# Patient Record
Sex: FEMALE | Race: BLACK OR AFRICAN AMERICAN
[De-identification: names, ages, dates, MRNs, and addresses within clinical notes are randomized per-mention and may not be internally consistent; named-entity substitution may affect disease eponyms.]

---

## 2017-06-21 ENCOUNTER — HOSPITAL ENCOUNTER (INPATIENT)
Dept: HOSPITAL 17 - NEPC | Age: 62
LOS: 2 days | Discharge: HOME | DRG: 885 | End: 2017-06-23
Attending: PSYCHIATRY & NEUROLOGY | Admitting: PSYCHIATRY & NEUROLOGY
Payer: MEDICARE

## 2017-06-21 VITALS
RESPIRATION RATE: 19 BRPM | HEART RATE: 84 BPM | OXYGEN SATURATION: 99 % | DIASTOLIC BLOOD PRESSURE: 53 MMHG | SYSTOLIC BLOOD PRESSURE: 112 MMHG

## 2017-06-21 VITALS
HEART RATE: 82 BPM | DIASTOLIC BLOOD PRESSURE: 56 MMHG | SYSTOLIC BLOOD PRESSURE: 99 MMHG | RESPIRATION RATE: 16 BRPM | OXYGEN SATURATION: 95 %

## 2017-06-21 VITALS — HEIGHT: 64 IN | BODY MASS INDEX: 21.49 KG/M2 | WEIGHT: 125.88 LBS

## 2017-06-21 VITALS
DIASTOLIC BLOOD PRESSURE: 74 MMHG | HEART RATE: 80 BPM | SYSTOLIC BLOOD PRESSURE: 138 MMHG | OXYGEN SATURATION: 91 % | RESPIRATION RATE: 18 BRPM

## 2017-06-21 VITALS
TEMPERATURE: 97.8 F | HEART RATE: 112 BPM | RESPIRATION RATE: 20 BRPM | DIASTOLIC BLOOD PRESSURE: 67 MMHG | SYSTOLIC BLOOD PRESSURE: 122 MMHG | OXYGEN SATURATION: 100 %

## 2017-06-21 DIAGNOSIS — Z88.0: ICD-10-CM

## 2017-06-21 DIAGNOSIS — E78.00: ICD-10-CM

## 2017-06-21 DIAGNOSIS — H40.9: ICD-10-CM

## 2017-06-21 DIAGNOSIS — Z86.59: ICD-10-CM

## 2017-06-21 DIAGNOSIS — F23: Primary | ICD-10-CM

## 2017-06-21 DIAGNOSIS — Z79.84: ICD-10-CM

## 2017-06-21 DIAGNOSIS — I10: ICD-10-CM

## 2017-06-21 DIAGNOSIS — J45.909: ICD-10-CM

## 2017-06-21 DIAGNOSIS — E11.9: ICD-10-CM

## 2017-06-21 DIAGNOSIS — K21.9: ICD-10-CM

## 2017-06-21 LAB
ALP SERPL-CCNC: 111 U/L (ref 45–117)
ALT SERPL-CCNC: 28 U/L (ref 10–53)
AMPHETAMINE, URINE: (no result)
ANION GAP SERPL CALC-SCNC: 12 MEQ/L (ref 5–15)
AST SERPL-CCNC: 30 U/L (ref 15–37)
BARBITURATES, URINE: (no result)
BASOPHILS # BLD AUTO: 0.1 TH/MM3 (ref 0–0.2)
BASOPHILS NFR BLD: 0.8 % (ref 0–2)
BILIRUB SERPL-MCNC: 0.4 MG/DL (ref 0.2–1)
BUN SERPL-MCNC: 6 MG/DL (ref 7–18)
CHLORIDE SERPL-SCNC: 105 MEQ/L (ref 98–107)
COCAINE UR-MCNC: (no result) NG/ML
COLOR UR: YELLOW
COMMENT (UR): (no result)
CULTURE IF INDICATED: (no result)
EOSINOPHIL # BLD: 0.2 TH/MM3 (ref 0–0.4)
EOSINOPHIL NFR BLD: 2.5 % (ref 0–4)
ERYTHROCYTE [DISTWIDTH] IN BLOOD BY AUTOMATED COUNT: 15.5 % (ref 11.6–17.2)
GFR SERPLBLD BASED ON 1.73 SQ M-ARVRAT: 64 ML/MIN (ref 89–?)
GLUCOSE UR STRIP-MCNC: (no result) MG/DL
HCO3 BLD-SCNC: 24 MEQ/L (ref 21–32)
HCT VFR BLD CALC: 42 % (ref 35–46)
HEMO FLAGS: (no result)
HGB UR QL STRIP: (no result)
KETONES UR STRIP-MCNC: (no result) MG/DL
LYMPHOCYTES # BLD AUTO: 4.3 TH/MM3 (ref 1–4.8)
LYMPHOCYTES NFR BLD AUTO: 49.8 % (ref 9–44)
MCH RBC QN AUTO: 26.5 PG (ref 27–34)
MCHC RBC AUTO-ENTMCNC: 31 % (ref 32–36)
MCV RBC AUTO: 85.5 FL (ref 80–100)
MONOCYTES NFR BLD: 8.5 % (ref 0–8)
MUCOUS THREADS #/AREA URNS LPF: (no result) /LPF
NEUTROPHILS # BLD AUTO: 3.3 TH/MM3 (ref 1.8–7.7)
NEUTROPHILS NFR BLD AUTO: 38.4 % (ref 16–70)
NITRITE UR QL STRIP: (no result)
PLATELET # BLD: 344 TH/MM3 (ref 150–450)
POTASSIUM SERPL-SCNC: 4.4 MEQ/L (ref 3.5–5.1)
RBC # BLD AUTO: 4.91 MIL/MM3 (ref 4–5.3)
SODIUM SERPL-SCNC: 141 MEQ/L (ref 136–145)
SP GR UR STRIP: 1.01 (ref 1–1.03)
SQUAMOUS #/AREA URNS HPF: 1 /HPF (ref 0–5)
WBC # BLD AUTO: 8.6 TH/MM3 (ref 4–11)

## 2017-06-21 PROCEDURE — 96374 THER/PROPH/DIAG INJ IV PUSH: CPT

## 2017-06-21 PROCEDURE — 80307 DRUG TEST PRSMV CHEM ANLYZR: CPT

## 2017-06-21 PROCEDURE — 70450 CT HEAD/BRAIN W/O DYE: CPT

## 2017-06-21 PROCEDURE — 81001 URINALYSIS AUTO W/SCOPE: CPT

## 2017-06-21 PROCEDURE — 96375 TX/PRO/DX INJ NEW DRUG ADDON: CPT

## 2017-06-21 PROCEDURE — 80053 COMPREHEN METABOLIC PANEL: CPT

## 2017-06-21 PROCEDURE — 85025 COMPLETE CBC W/AUTO DIFF WBC: CPT

## 2017-06-21 PROCEDURE — P9612 CATHETERIZE FOR URINE SPEC: HCPCS

## 2017-06-21 PROCEDURE — 96372 THER/PROPH/DIAG INJ SC/IM: CPT

## 2017-06-21 RX ADMIN — HALOPERIDOL ONE MG: 5 INJECTION INTRAMUSCULAR at 18:10

## 2017-06-21 RX ADMIN — HALOPERIDOL ONE MG: 5 INJECTION INTRAMUSCULAR at 17:47

## 2017-06-21 NOTE — PD
HPI


Chief Complaint:  Psychiatric Symptoms


Time Seen by Provider:  17:57


Travel History


International Travel<30 days:  No


Contact w/Intl Traveler<30days:  No


Traveled to known affect area:  No





History of Present Illness


HPI


This is a 62-year-old female with a history of seizure disorder, bipolar 

disorder, previous psychosis, presents here with acute psychosis.   

states that over last 2 days she's been extremely manic intermittently.  He 

reports they were seen by their neurologist today who recommended that they go 

to her psychiatrist for management.  Patient's  states that she's been 

seen multiple times at [facility].  Previous records show that she's 

been admitted for acute psychosis.  There is no reported ingestions of toxic 

substances.  There is no report of injury.  The  states that she becomes 

violent and then becomes cooperative man becomes violent again.  She'll be 

placed under Baker act by this physician.





PFSH


Past Medical History


Asthma:  Yes


Blood Disorders:  No


Bipolar Disorder:  Yes


Cancer:  No


Cardiovascular Problems:  Yes


High Cholesterol:  Yes


Diabetes:  Yes


Patient Takes Glucophage:  Yes


Diminished Hearing:  No


Endocrine:  No


Gastrointestinal Disorders:  Yes


GERD:  Yes


Glaucoma:  Yes


Genitourinary:  No


Immune Disorder:  No


Musculoskeletal:  Yes


Neurologic:  Yes


Psychiatric:  Yes (BIPOLAR )


Reproductive:  No


Respiratory:  Yes


Seizures:  Yes


Sickle Cell Disease:  No


Tetanus Vaccination:  > 5 Years


Influenza Vaccination:  Yes


Pregnant?:  Not Pregnant


Tubal Ligation:  Yes





Past Surgical History


Abdominal Surgery:  No


Cardiac Surgery:  No


Ear Surgery:  No


Endocrine Surgery:  No


Eye Surgery:  No


Genitourinary Surgery:  No


Gynecologic Surgery:  Yes (FROM HISTORY- PATIENT UNABLE TO PROVIDE INFO)


Oral Surgery:  No


Thoracic Surgery:  No


Other Surgery:  Yes (TUBAL LIGATION 1985)





Social History


Alcohol Use:  No


Tobacco Use:  No


Substance Use:  No





Allergies-Medications


(Allergen,Severity, Reaction):  


Coded Allergies:  


     Penicillin (Verified  Allergy, Intermediate, SWELLING, 6/21/17)


Reported Meds & Prescriptions





Reported Meds & Active Scripts


Active


Reported


Folic Acid 1 Mg Tablet 1 Mg PO DAILY


Ergocalciferol 50,000 Unit Cap 50,000 Units PO Q7D


Donepezil 10 Mg Tab 10 Mg PO HS


Coreg (Carvedilol) 3.125 Mg Tab 3.125 Mg PO BID


Amitriptyline (Amitriptyline HCl) 150 Mg Tab 150 Mg PO HS


Xanax (Alprazolam) 0.5 Mg Tab 0.5 Mg PO Q6H PRN


Risperdal (Risperidone) 1 Mg Tab 1 Mg PO BID


Metformin (Metformin HCl) 1,000 Mg Tab 1,000 Mg PO BIDPC


     With meals








Review of Systems


ROS Limitations:  Psychotic (unable to obtain review of systems secondary to 

patient's psychosis.)


Except as stated in HPI:  all other systems reviewed are Neg





Physical Exam


Narrative


GENERAL: Well developed well-nourished female who is acutely psychotic and 

aggressive towards staff.  The patient was spitting at staff and required 

locked restraints.


SKIN: Focused skin assessment warm/dry.


HEAD: Atraumatic. Normocephalic. 


EYES:  No scleral icterus. No injection or drainage. 


ENT: No nasal bleeding or discharge.  Mucous membranes pink and moist.


NECK: Trachea midline. No JVD. 


CARDIOVASCULAR: Tachycardic.  No murmurs appreciated.


RESPIRATORY: No accessory muscle use. Clear to auscultation. Breath sounds 

equal bilaterally. 


GASTROINTESTINAL: Abdomen soft, non-tender, nondistended. Hepatic and splenic 

margins not palpable. 


MUSCULOSKELETAL: No obvious deformities. No clubbing.  No cyanosis.  No edema. 


NEUROLOGICAL: Awake and psychotic.  The patient was screaming and 

intermittently singing amazing Tracy.. No obvious cranial nerve deficits.  

Motor grossly within normal limits. Normal speech.


PSYCHIATRIC: Acutely psychotic..





Data


Data


Last Documented VS





Vital Signs








  Date Time  Temp Pulse Resp B/P Pulse Ox O2 Delivery O2 Flow Rate FiO2


 


6/21/17 17:50   18     


 


6/21/17 17:46 97.8 112  122/67 100   








Orders





 Haloperidol Inj (Haldol Inj) (6/21/17 17:47)


Lorazepam Inj (Ativan Inj) (6/21/17 17:48)


Diphenhydramine Inj (Benadryl Inj) (6/21/17 17:49)


Complete Blood Count With Diff (6/21/17 18:03)


Comprehensive Metabolic Panel (6/21/17 18:03)


Urinalysis - C+S If Indicated (6/21/17 18:03)


Psych Screen (6/21/17 18:03)


Haloperidol Inj (Haldol Inj) (6/21/17 18:15)


Lorazepam Inj (Ativan Inj) (6/21/17 18:15)


Drug Screen, Random Urine (6/21/17 18:03)


Ct Brain W/O Iv Contrast(Rout) (6/21/17 18:03)


Diphenhydramine Inj (Benadryl Inj) (6/21/17 18:15)





Labs





 Laboratory Tests








Test 6/21/17 6/21/17





 18:11 18:30


 


White Blood Count 8.6 TH/MM3 


 


Red Blood Count 4.91 MIL/MM3 


 


Hemoglobin 13.0 GM/DL 


 


Hematocrit 42.0 % 


 


Mean Corpuscular Volume 85.5 FL 


 


Mean Corpuscular Hemoglobin 26.5 PG 


 


Mean Corpuscular Hemoglobin 31.0 % 





Concent  


 


Red Cell Distribution Width 15.5 % 


 


Platelet Count 344 TH/MM3 


 


Mean Platelet Volume 8.6 FL 


 


Neutrophils (%) (Auto) 38.4 % 


 


Lymphocytes (%) (Auto) 49.8 % 


 


Monocytes (%) (Auto) 8.5 % 


 


Eosinophils (%) (Auto) 2.5 % 


 


Basophils (%) (Auto) 0.8 % 


 


Neutrophils # (Auto) 3.3 TH/MM3 


 


Lymphocytes # (Auto) 4.3 TH/MM3 


 


Monocytes # (Auto) 0.7 TH/MM3 


 


Eosinophils # (Auto) 0.2 TH/MM3 


 


Basophils # (Auto) 0.1 TH/MM3 


 


CBC Comment DIFF FINAL  


 


Differential Comment   


 


Urine Color  YELLOW 


 


Urine Turbidity  CLEAR 


 


Urine pH  5.5 


 


Urine Specific Gravity  1.013 


 


Urine Protein  NEG mg/dL


 


Urine Glucose (UA)  NEG mg/dL


 


Urine Ketones  NEG mg/dL


 


Urine Occult Blood  NEG 


 


Urine Nitrite  NEG 


 


Urine Bilirubin  NEG 


 


Urine Urobilinogen  LESS THAN 2.0





  MG/DL


 


Urine Leukocyte Esterase  TRACE 


 


Urine RBC  LESS THAN 1





  /hpf


 


Urine WBC  LESS THAN 1





  /hpf


 


Urine Squamous Epithelial  1 /hpf





Cells  


 


Urine Mucus  FEW /lpf


 


Microscopic Urinalysis Comment  CATH-CULT NOT





  IND











MDM


Medical Decision Making


Medical Screen Exam Complete:  Yes


Emergency Medical Condition:  Yes


Differential Diagnosis


Acute manic episode versus psychosis NOS versus substance induced mood disorder 

versus metabolic derangement.


Narrative Course


62-year-old female who presents acutely psychotic.  The patient has a history 

of bipolar disorder.  She's been admitted previously for psychosis.  CT scan is 

pending at this time.  Metabolic work up is pending at this time.  She'll be 

placed on a Baker act by this physician.  If all is within normal limits, she'

ll be cleared for psychiatric admission.  She'll be signed out to Dr. He Freed cool follow up on her labs and CT and if appropriate clear her for 

psychiatry.





Diagnosis





 Primary Impression:  


 Acute psychosis


 Additional Impressions:  


 History of bipolar disorder


 History of seizure disorder








Bereket Mclaughlin MD Jun 21, 2017 19:00

## 2017-06-21 NOTE — RADRPT
EXAM DATE/TIME:  06/21/2017 18:45 

 

[facility] COMPARISON:     

No previous studies available for comparison.

 

 

INDICATIONS :     

Altered mental status.

                      

 

RADIATION DOSE:     

56.80 CTDIvol (mGy) 

 

 

 

MEDICAL HISTORY :     

Seizures. Cardiovascular disease 

 

SURGICAL HISTORY :      

Tubal ligation. 

 

ENCOUNTER:      

Initial

 

ACUITY:      

1 day

 

PAIN SCALE:      

0/10

 

LOCATION:        

cranial 

 

TECHNIQUE:     

Multiple contiguous axial images were obtained of the head.  Using automated exposure control and adj
ustment of the mA and/or kV according to patient size, radiation dose was kept as low as reasonably a
chievable to obtain optimal diagnostic quality images. 

 

FINDINGS:     

 

CEREBRUM:     

The ventricles are normal.  No evidence of midline shift, mass lesion, hemorrhage or acute infarction
.  No extra-axial fluid collections are seen.

 

POSTERIOR FOSSA:     

The cerebellum and brainstem demonstrate no acute finding.  The 4th ventricle is midline.  The cerebe
llopontine angle is unremarkable.

 

EXTRACRANIAL:     

Visualized sinuses are clear.

 

SKULL:     

The calvaria is intact.  No evidence of skull fracture.

 

CONCLUSION:     

No acute intracranial abnormality is identified.

 

 

 

 Stephon Reynolds MD on June 21, 2017 at 19:14           

Board Certified Radiologist.

 This report was verified electronically.

## 2017-06-21 NOTE — PD
Data


Data


Last Documented VS





Vital Signs








  Date Time  Temp Pulse Resp B/P Pulse Ox O2 Delivery O2 Flow Rate FiO2


 


6/21/17 19:01  82 16 99/56 95 Room Air  


 


6/21/17 17:46 97.8       








Orders





 Haloperidol Inj (Haldol Inj) (6/21/17 17:47)


Lorazepam Inj (Ativan Inj) (6/21/17 17:48)


Diphenhydramine Inj (Benadryl Inj) (6/21/17 17:49)


Complete Blood Count With Diff (6/21/17 18:03)


Comprehensive Metabolic Panel (6/21/17 18:03)


Urinalysis - C+S If Indicated (6/21/17 18:03)


Psych Screen (6/21/17 18:03)


Haloperidol Inj (Haldol Inj) (6/21/17 18:15)


Lorazepam Inj (Ativan Inj) (6/21/17 18:15)


Drug Screen, Random Urine (6/21/17 18:03)


Ct Brain W/O Iv Contrast(Rout) (6/21/17 18:03)


Diphenhydramine Inj (Benadryl Inj) (6/21/17 18:15)


Cath For Specimen (6/21/17 18:54)


Restraints Violent (6/21/17 18:54)





Labs





 Laboratory Tests








Test 6/21/17 6/21/17





 18:11 18:30


 


White Blood Count 8.6 TH/MM3 


 


Red Blood Count 4.91 MIL/MM3 


 


Hemoglobin 13.0 GM/DL 


 


Hematocrit 42.0 % 


 


Mean Corpuscular Volume 85.5 FL 


 


Mean Corpuscular Hemoglobin 26.5 PG 


 


Mean Corpuscular Hemoglobin 31.0 % 





Concent  


 


Red Cell Distribution Width 15.5 % 


 


Platelet Count 344 TH/MM3 


 


Mean Platelet Volume 8.6 FL 


 


Neutrophils (%) (Auto) 38.4 % 


 


Lymphocytes (%) (Auto) 49.8 % 


 


Monocytes (%) (Auto) 8.5 % 


 


Eosinophils (%) (Auto) 2.5 % 


 


Basophils (%) (Auto) 0.8 % 


 


Neutrophils # (Auto) 3.3 TH/MM3 


 


Lymphocytes # (Auto) 4.3 TH/MM3 


 


Monocytes # (Auto) 0.7 TH/MM3 


 


Eosinophils # (Auto) 0.2 TH/MM3 


 


Basophils # (Auto) 0.1 TH/MM3 


 


CBC Comment DIFF FINAL  


 


Differential Comment   


 


Sodium Level 141 MEQ/L 


 


Potassium Level 4.4 MEQ/L 


 


Chloride Level 105 MEQ/L 


 


Carbon Dioxide Level 24.0 MEQ/L 


 


Anion Gap 12 MEQ/L 


 


Blood Urea Nitrogen 6 MG/DL 


 


Creatinine 1.05 MG/DL 


 


Estimat Glomerular Filtration 64 ML/MIN 





Rate  


 


Random Glucose 145 MG/DL 


 


Calcium Level 9.4 MG/DL 


 


Total Bilirubin 0.4 MG/DL 


 


Aspartate Amino Transf 30 U/L 





(AST/SGOT)  


 


Alanine Aminotransferase 28 U/L 





(ALT/SGPT)  


 


Alkaline Phosphatase 111 U/L 


 


Total Protein 8.7 GM/DL 


 


Albumin 4.2 GM/DL 


 


Urine Color  YELLOW 


 


Urine Turbidity  CLEAR 


 


Urine pH  5.5 


 


Urine Specific Gravity  1.013 


 


Urine Protein  NEG mg/dL


 


Urine Glucose (UA)  NEG mg/dL


 


Urine Ketones  NEG mg/dL


 


Urine Occult Blood  NEG 


 


Urine Nitrite  NEG 


 


Urine Bilirubin  NEG 


 


Urine Urobilinogen  LESS THAN 2.0





  MG/DL


 


Urine Leukocyte Esterase  TRACE 


 


Urine RBC  LESS THAN 1





  /hpf


 


Urine WBC  LESS THAN 1





  /hpf


 


Urine Squamous Epithelial  1 /hpf





Cells  


 


Urine Mucus  FEW /lpf


 


Microscopic Urinalysis Comment  CATH-CULT NOT





  IND


 


Urine Opiates Screen  NEG 


 


Urine Barbiturates Screen  NEG 


 


Urine Amphetamines Screen  NEG 


 


Urine Benzodiazepines Screen  POS 


 


Urine Cocaine Screen  NEG 


 


Urine Cannabinoids Screen  NEG 











MDM


Supervised Visit with LEONIDAS:  No


Narrative Course


This patient is checked out to me by Dr. Mclaughlin at 7 PM.





Patient came in acutely psychotic but with some time in medication is calm down 

considerably and is now resting comfortable and cooperatively in the bed


She is out of restraints





I reviewed the entirety of her workup which essentially is negative.


She has normal labs and a negative brain CT





She is as medically stable as can be made and as she is under the Baker act 

disposition will be per psychiatry after screening


Diagnosis





 Primary Impression:  


 Acute psychosis


 Additional Impressions:  


 History of bipolar disorder


 History of seizure disorder








He Chaudhry MD Jun 21, 2017 20:08

## 2017-06-22 VITALS
RESPIRATION RATE: 18 BRPM | OXYGEN SATURATION: 99 % | SYSTOLIC BLOOD PRESSURE: 165 MMHG | HEART RATE: 85 BPM | DIASTOLIC BLOOD PRESSURE: 74 MMHG

## 2017-06-22 VITALS — RESPIRATION RATE: 18 BRPM | DIASTOLIC BLOOD PRESSURE: 84 MMHG | HEART RATE: 80 BPM | SYSTOLIC BLOOD PRESSURE: 180 MMHG

## 2017-06-22 VITALS
OXYGEN SATURATION: 99 % | SYSTOLIC BLOOD PRESSURE: 130 MMHG | HEART RATE: 78 BPM | DIASTOLIC BLOOD PRESSURE: 61 MMHG | RESPIRATION RATE: 16 BRPM

## 2017-06-22 VITALS
RESPIRATION RATE: 18 BRPM | DIASTOLIC BLOOD PRESSURE: 88 MMHG | OXYGEN SATURATION: 99 % | SYSTOLIC BLOOD PRESSURE: 116 MMHG | HEART RATE: 116 BPM

## 2017-06-22 VITALS
OXYGEN SATURATION: 98 % | RESPIRATION RATE: 19 BRPM | SYSTOLIC BLOOD PRESSURE: 146 MMHG | DIASTOLIC BLOOD PRESSURE: 68 MMHG | HEART RATE: 93 BPM

## 2017-06-22 RX ADMIN — CARVEDILOL SCH MG: 3.12 TABLET, FILM COATED ORAL at 18:00

## 2017-06-23 VITALS
HEART RATE: 99 BPM | RESPIRATION RATE: 16 BRPM | TEMPERATURE: 96.9 F | OXYGEN SATURATION: 98 % | SYSTOLIC BLOOD PRESSURE: 52 MMHG

## 2017-06-23 VITALS
OXYGEN SATURATION: 98 % | HEART RATE: 118 BPM | SYSTOLIC BLOOD PRESSURE: 102 MMHG | TEMPERATURE: 97.6 F | DIASTOLIC BLOOD PRESSURE: 58 MMHG | RESPIRATION RATE: 16 BRPM

## 2017-06-23 RX ADMIN — CARVEDILOL SCH MG: 3.12 TABLET, FILM COATED ORAL at 06:00

## 2017-06-23 RX ADMIN — CARVEDILOL SCH MG: 3.12 TABLET, FILM COATED ORAL at 06:26

## 2017-06-23 NOTE — PD.PN.STU
Subjective


Remarks


Patient is a 62 year old AA female on disability living with her  and 

adult daughter with a history of bipolar disorder, seizures, and psychosis who 

presents to the clinic from the ED. Patient arrived at the ED last night with 

acute psychosis lasting for several hours prior to arrival. patient reports 

difficulty with organizing her medications which has lead to the inability to 

take them consistently and correctly, causing this psychotic event. She reports 

these events occur once every 2-3 months. When on medication, patient reports 

being stable and functional. She does not remember exactly what medications she 

is on. Patient denies any current psychotic symptoms, but does report feeling 

"groggy" and tired. Patient denies any suicidal or homicidal ideation or 

attempts, past or present





Patient reports additional medical history of hypertension and diabetes. 

Patient denies any alcohol, tobacco, or recreational drug use.





Objective


Vitals





 Vital Signs








  Date Time  Temp Pulse Resp B/P Pulse Ox O2 Delivery O2 Flow Rate FiO2


 


6/23/17 05:30 96.9 99 16 52/ 98   


 


6/23/17 02:50 97.6 118 16 102/58 98   


 


6/22/17 22:00  116 18 116/88 99 Room Air  


 


6/22/17 20:46  80 18 180/84  Room Air  








Result Diagram:  


6/21/17 1811                                                                   

             6/21/17 1811





Objective Remarks


Patient is a well nourished well developed pleasant female who appears her 

stated age. patient is calm and cooperative. Speech is of normal rate and 

contact. Affect is of normal range and intensity. Thought process is clear, 

linear, and goal-orientated. Thought content, cognition, insight, and judgement 

are appropiate.





A/P


Assessment and Plan


1) Brief Psychotic Disorder


2) Bipolar disorder with psychosis - chronic





Patient is a 62 year old female with history of bipolar disorder presenting 

today with brief psychotic disorder. She currently denies any symptoms of 

psychosis and expresses understanding of the importance of her medication's 

effect on her psychosis. She expresses desire to seek help in organizing her 

many anti-psychotic medications. She lives in a stable home with supportive 

family and has been living with this disease for over 20 years. I believe 

patient is in a stable enough state for discharge on conditions that she 

immediately seeks outpatient help in organizing her medications.








Kevin Barajas M3 Jun 23, 2017 13:18

## 2017-06-23 NOTE — HHI.HP
Provisional Diagnosis


Admission Date


Jun 22, 2017 at 16:48


Axis I.


Brief psychotic disorder F 23, history of bipolar disorder Z 86.59





                               Certification of Person's Competence 


                           To Provide Express and Informed Consent





I have personally examined Hermelinda Cabezas , a person being served at 

[facility] on, Jun 23, 2017 13:56.


Express and informed consent means consent voluntarily given in writing, by a 

competent person, after sufficient explanation and disclosure of the subject 

matter involved to enable the person to make a knowing and willful decision 

without any element of force, fraud, deceit, duress, or other form of 

constraint or coercion.





This person is 18 years of age or older, is not now known to be incompetent to 

consent to treatment with a guardian advocate, and does not have a health care 

surrogate or proxy currently making medical treatment decisions.  I have found 

this person to be one of the following:





[xx] Competent to provide express and informed consent, as defined above, for 

voluntary admission to this facility and is competent to provide express and 

informed consent for treatment.  He/she has the consistent capacity to make 

well reasoned, willful, and knowing decisions concerning his or her medical or 

mental health treatment.  The person fully and consistently understands the 

purpose of the admission for examination/placement and is fully capable of 

personally exercising all rights assured [place]





[] Incompetent to provide express and informed consent to voluntary admission, 

and this is incompetent to provide express and informed consent to treatment.  

The person must be transferred to involuntary status and a petition for a 

guardian advocate filed with the Circuit Court.





[] Refusing to provide express and informed consent to voluntary admission but 

is competent to provide express and informed consent for treatment.  The person 

must be discharged or transferred to involuntary status.





Form shall be completed within 24 hours of a person's arrival at the receiving 

facility and filed in the clinical record of each person:


1. Admitted on a voluntary basis


2. Permitted to provide express and informed consent to his/her own treatment


3. Allowed to transfer from involuntary to voluntary status


4. Prior to permitting a person to consent to his or her own treatment after 

having been previously found incompetent to consent to treatment.





History of Present Illness


Capacity:  Has Capacity


HPI


Patient is a 62-year-old Afro-American female comes here under Vasquez act signed 

by Dr. Bereket Mclaughlin [facility] dated 6/21/17 at 1900 hrs. that documented 

reviewed and agreed with essentially stating actively psychotic with aggressive 

behavior to family and staff.  Patient seen screened in ED urine toxicology 

positive for benzodiazepines which is being prescribed.  It appears patient has 

a long history of mental health contact and various medications.  At the 

present time she states she is seeing 2 different clinicians did not take nurse 

practitioner at [facility] and Dr. Jordan rogers local psychiatrist.  

This is led to some confusion with her  and daughter as to the 

appropriate medications.  Leading to the patient showing the psychotic break 

she acknowledges auditory hallucinations and vague visual hallucinations.  

Stating she had seen faces on the TV being distorted.  She did receive Haldol 

Ativan and Benadryl in the ED.  At the present time patient sitting quietly in 

her room medical student [employer] and nurse Rosalva present throughout session 

patient calm cooperative alert and oriented.  Now denying voices or visions 

denying suicidality homicidality.  Does acknowledge some multiple year history 

of issues with multiple prior psychiatric hospitalizations.  She denies any 

alcohol or drug use with this.  We did discuss the need to live up relationship 

with one prescribing psychiatrist.  To prevent the type of confusion perhaps 

duplication of medications in any event at the present time patient is calm 

cooperative willing to follow up with one clinician.  It appears she would 

choose Dr. Jordan rogers.  I will write 1 week medications that I recommend to 

bridge this transition including Respinol 1 mg twice a day Xanax 0.5 at at 

bedtime and Elavil 75 mg at at bedtime with no refills patient is agreeable to 

this.  We will then discharge patient to her family for follow-up within 1 week 

with Dr. rogers





Review of Systems


Except as stated in HPI:  all other systems reviewed are Neg





Past Psych History


Psychological trauma history


Denies


Violence risk - others (6 mos)


Low


Violence risk - self (6 mos)


Low





Substance Abuse History


Drugs/Alcohol past 12 months


Denies





Past Family Social History


Coded Allergies:  


     Penicillin (Verified  Allergy, Intermediate, SWELLING, 6/21/17)


Past Medical History


Patient medically cleared ED


Reported Medications


Folic Acid 1 Mg Tablet1 Mg PO DAILY 


   6/21/17


Ergocalciferol 50,000 Unit Cap50,000 Units PO Q7D  #30 CAP  Ref 0


   6/21/17


Donepezil 10 Mg Tab10 Mg PO HS  #30 TAB  Ref 0


   6/21/17


Carvedilol (Coreg)3.125 Mg Tab3.125 Mg PO BID  #60 TAB  Ref 0


   6/21/17


Amitriptyline 150 Mg [id number] Mg PO HS  #30 TAB  Ref 0


   6/21/17


Alprazolam (Xanax)0.5 Mg Tab0.5 Mg PO Q6H PRN (ANXIETY)  Ref 0


   6/21/17


Risperidone (Risperdal)1 Mg Tab1 Mg PO BID  #30 TAB  Ref 0


   6/21/17


Metformin 1,000 Mg Tab1,000 Mg PO BIDPC  #60 TAB  Ref 0


   With meals


   6/21/17


Discontinued Reported Medications


Metformin 500 Mg [id number] Mg PO BIDPC  #60 TAB  Ref 0


   With meals


   6/21/17





 Current Medications








 Medications


  (Trade)  Dose


 Ordered  Sig/Jayde


 Route  Start Time


 Stop Time Status Last Admin


 


  (Glucophage)  1,000 mg  BID


 PO  6/22/17 21:00


    6/23/17 09:10


 


 


  (risperDAL)  1 mg  BID


 PO  6/22/17 21:00


    6/22/17 21:00


 


 


  (Xanax)  0.5 mg  Q6H  PRN


 PO  6/22/17 18:00


     


 


 


  (Coreg)  3.125 mg  Q12H


 PO  6/22/17 18:00


    6/22/17 18:00


 


 


  (Aricept)  10 mg  HS


 PO  6/22/17 21:00


    6/22/17 21:00


 


 


  (Folate)  1 mg  DAILY


 PO  6/23/17 09:00


    6/23/17 09:10


 


 


  (Ativan)  1 mg  Q6H  PRN


 PO  6/22/17 18:00


     


 


 


  (Ativan Inj)  1 mg  Q6H  PRN


 IM  6/22/17 18:00


    6/23/17 00:11


 


 


  (Cogentin)  1 mg  Q12H  PRN


 PO  6/22/17 18:00


     


 


 


  (Cogentin Inj)  1 mg  Q12H  PRN


 IM  6/22/17 18:00


     


 


 


  (Benadryl)  50 mg  HS  PRN


 PO  6/22/17 18:00


     


 


 


  (Benadryl Inj)  50 mg  HS  PRN


 IM  6/22/17 18:00


     


 


 


  (Tylenol)  650 mg  Q4H  PRN


 PO  6/22/17 18:00


     


 


 


  (Milk Of


 Magnesia Liq)  30 ml  DAILY  PRN


 PO  6/22/17 18:00


     


 


 


  (Mag-Al Plus


 Susp Liq)  30 ml  Q6H  PRN


 PO  6/22/17 18:00


     


 


 


  (SEROquel)  150 mg  BID


 PO  6/22/17 21:00


    6/22/17 21:00


 


 


  (Pill Splitter)  1 ea  UNSCH  PRN


 OTHER  6/22/17 18:15


     


 








Family History


Denies


Social History


Patient lives with  and adult daughter who is moved back and help care 

for


Patient's Strengths (min. 2)


Patient verbal irritable [facility] has supportive family





Physical Exam


Patient seen screened in ED exam reviewed and agreed with patient sitting 

quietly in her room no apparent distress, there is no respiratory distress, no 

abdominal discomfort, patient will 4 extremities without difficulty no abnormal 

motor movements noted


Vital Signs





 Vital Signs








  Date Time  Temp Pulse Resp B/P Pulse Ox O2 Delivery O2 Flow Rate FiO2


 


6/23/17 05:30 96.9 99 16 52/ 98   


 


6/22/17 22:00      Room Air  











Mental Status Examination


Alert oriented  female calm cooperative with good eye contact 

clean and neat


Appearance


Clean neatly


Speech:  Unremarkable


Orientation:  x3


Memory:  Unremarkable


Thought Process:  Logical, Linear


Thought Content:  Bizarre thinking (mildly)


Language


Fair


Fund of Knowledge


Fair


Hallucination Type:  Auditory, Visual (vague)


Attention and Concentration:  Other (fair)


Suicidal Ideation:  No (denies)


Previous Suicide Attempts:  No


Homicidal Ideation:  No (denies)


Insight:  Poor


Judgment:  Poor


Affect:  Other (good range of motion intensity)


Mood:  Euthymic


Motor Activity:  Normal gait





Assessment & Plan


Problem List:  


(1) Acute psychosis


ICD Code:  F23


(2) History of bipolar disorder


ICD Code:  Z86.59


Assessment & Plan


Estimated LOS:  days consistent patient does not meet Vasquez criteria will lift 

Vasquez act patient to be discharged herself with a one-week supply of medication 

mentioned above follow-up within 1 week with Dr. Jordan rogers


Discharge Planning


See above


Request HC Surrog/Guard Advoc?:  No








Stephon Dominguez MD Jun 23, 2017 14:06

## 2018-04-09 ENCOUNTER — HOSPITAL ENCOUNTER (INPATIENT)
Dept: HOSPITAL 17 - NEPC | Age: 63
LOS: 2 days | Discharge: PSYCHIATRIC OR SUBSTANCE USE FACILITY | DRG: 885 | End: 2018-04-11
Payer: MEDICARE

## 2018-04-09 VITALS
HEART RATE: 145 BPM | DIASTOLIC BLOOD PRESSURE: 130 MMHG | TEMPERATURE: 100.9 F | RESPIRATION RATE: 20 BRPM | SYSTOLIC BLOOD PRESSURE: 168 MMHG | OXYGEN SATURATION: 98 %

## 2018-04-09 VITALS
TEMPERATURE: 98.4 F | RESPIRATION RATE: 20 BRPM | DIASTOLIC BLOOD PRESSURE: 71 MMHG | SYSTOLIC BLOOD PRESSURE: 114 MMHG | HEART RATE: 86 BPM | OXYGEN SATURATION: 100 %

## 2018-04-09 VITALS
OXYGEN SATURATION: 100 % | RESPIRATION RATE: 16 BRPM | SYSTOLIC BLOOD PRESSURE: 115 MMHG | DIASTOLIC BLOOD PRESSURE: 64 MMHG | HEART RATE: 82 BPM | TEMPERATURE: 98.2 F

## 2018-04-09 VITALS — HEART RATE: 88 BPM

## 2018-04-09 VITALS
SYSTOLIC BLOOD PRESSURE: 168 MMHG | HEART RATE: 145 BPM | DIASTOLIC BLOOD PRESSURE: 130 MMHG | TEMPERATURE: 100.9 F | OXYGEN SATURATION: 100 % | RESPIRATION RATE: 20 BRPM

## 2018-04-09 VITALS
SYSTOLIC BLOOD PRESSURE: 121 MMHG | OXYGEN SATURATION: 100 % | RESPIRATION RATE: 20 BRPM | HEART RATE: 97 BPM | DIASTOLIC BLOOD PRESSURE: 75 MMHG

## 2018-04-09 VITALS — WEIGHT: 153 LBS | BODY MASS INDEX: 24.59 KG/M2 | HEIGHT: 66 IN

## 2018-04-09 VITALS
HEART RATE: 94 BPM | SYSTOLIC BLOOD PRESSURE: 124 MMHG | OXYGEN SATURATION: 98 % | DIASTOLIC BLOOD PRESSURE: 60 MMHG | TEMPERATURE: 98 F | RESPIRATION RATE: 20 BRPM

## 2018-04-09 VITALS
OXYGEN SATURATION: 99 % | RESPIRATION RATE: 20 BRPM | DIASTOLIC BLOOD PRESSURE: 74 MMHG | HEART RATE: 105 BPM | SYSTOLIC BLOOD PRESSURE: 131 MMHG

## 2018-04-09 DIAGNOSIS — Z88.0: ICD-10-CM

## 2018-04-09 DIAGNOSIS — E87.2: ICD-10-CM

## 2018-04-09 DIAGNOSIS — R32: ICD-10-CM

## 2018-04-09 DIAGNOSIS — F31.9: ICD-10-CM

## 2018-04-09 DIAGNOSIS — G40.909: ICD-10-CM

## 2018-04-09 DIAGNOSIS — E11.9: ICD-10-CM

## 2018-04-09 DIAGNOSIS — H40.9: ICD-10-CM

## 2018-04-09 DIAGNOSIS — E72.20: ICD-10-CM

## 2018-04-09 DIAGNOSIS — A53.0: ICD-10-CM

## 2018-04-09 DIAGNOSIS — Z79.84: ICD-10-CM

## 2018-04-09 DIAGNOSIS — K21.9: ICD-10-CM

## 2018-04-09 DIAGNOSIS — I10: ICD-10-CM

## 2018-04-09 DIAGNOSIS — F22: Primary | ICD-10-CM

## 2018-04-09 DIAGNOSIS — F03.90: ICD-10-CM

## 2018-04-09 DIAGNOSIS — F29: ICD-10-CM

## 2018-04-09 DIAGNOSIS — Z78.1: ICD-10-CM

## 2018-04-09 DIAGNOSIS — E78.00: ICD-10-CM

## 2018-04-09 LAB
ALBUMIN SERPL-MCNC: 3.3 GM/DL (ref 3.4–5)
ALBUMIN SERPL-MCNC: 3.9 GM/DL (ref 3.4–5)
ALP SERPL-CCNC: 107 U/L (ref 45–117)
ALP SERPL-CCNC: 122 U/L (ref 45–117)
ALT SERPL-CCNC: 15 U/L (ref 10–53)
ALT SERPL-CCNC: 18 U/L (ref 10–53)
AST SERPL-CCNC: 26 U/L (ref 15–37)
AST SERPL-CCNC: 30 U/L (ref 15–37)
BASOPHILS # BLD AUTO: 0 TH/MM3 (ref 0–0.2)
BASOPHILS NFR BLD: 0.5 % (ref 0–2)
BILIRUB INDIRECT SERPL-MCNC: 0.3 MG/DL (ref 0–0.8)
BILIRUB SERPL-MCNC: 0.5 MG/DL (ref 0.2–1)
BILIRUB SERPL-MCNC: 0.7 MG/DL (ref 0.2–1)
BUN SERPL-MCNC: 8 MG/DL (ref 7–18)
CALCIUM SERPL-MCNC: 9.1 MG/DL (ref 8.5–10.1)
CHLORIDE SERPL-SCNC: 102 MEQ/L (ref 98–107)
COLOR UR: (no result)
CREAT SERPL-MCNC: 1.19 MG/DL (ref 0.5–1)
DIRECT BILIRUBIN ADULT: 0.2 MG/DL (ref 0–0.2)
EOSINOPHIL # BLD: 0 TH/MM3 (ref 0–0.4)
EOSINOPHIL NFR BLD: 0.1 % (ref 0–4)
ERYTHROCYTE [DISTWIDTH] IN BLOOD BY AUTOMATED COUNT: 14.4 % (ref 11.6–17.2)
GFR SERPLBLD BASED ON 1.73 SQ M-ARVRAT: 55 ML/MIN (ref 89–?)
GLUCOSE SERPL-MCNC: 190 MG/DL (ref 74–106)
GLUCOSE UR STRIP-MCNC: 70 MG/DL
HCO3 BLD-SCNC: 17.8 MEQ/L (ref 21–32)
HCT VFR BLD CALC: 38.6 % (ref 35–46)
HGB BLD-MCNC: 12.5 GM/DL (ref 11.6–15.3)
HGB UR QL STRIP: (no result)
KETONES UR STRIP-MCNC: 40 MG/DL
LACTIC ACID SEPSIS PROTOCOL: 2.5 MMOL/L (ref 0.4–2)
LACTIC ACID SEPSIS PROTOCOL: 7.8 MMOL/L (ref 0.4–2)
LYMPHOCYTES # BLD AUTO: 1.1 TH/MM3 (ref 1–4.8)
LYMPHOCYTES NFR BLD AUTO: 13.2 % (ref 9–44)
MAGNESIUM SERPL-MCNC: 1.9 MG/DL (ref 1.5–2.5)
MCH RBC QN AUTO: 27 PG (ref 27–34)
MCHC RBC AUTO-ENTMCNC: 32.4 % (ref 32–36)
MCV RBC AUTO: 83.2 FL (ref 80–100)
MONOCYTE #: 0.5 TH/MM3 (ref 0–0.9)
MONOCYTES NFR BLD: 5.8 % (ref 0–8)
NEUTROPHILS # BLD AUTO: 6.9 TH/MM3 (ref 1.8–7.7)
NEUTROPHILS NFR BLD AUTO: 80.4 % (ref 16–70)
NITRITE UR QL STRIP: (no result)
PLATELET # BLD: 328 TH/MM3 (ref 150–450)
PMV BLD AUTO: 7.6 FL (ref 7–11)
PROT SERPL-MCNC: 7.5 GM/DL (ref 6.4–8.2)
PROT SERPL-MCNC: 8.5 GM/DL (ref 6.4–8.2)
RBC # BLD AUTO: 4.64 MIL/MM3 (ref 4–5.3)
SODIUM SERPL-SCNC: 137 MEQ/L (ref 136–145)
SP GR UR STRIP: 1.01 (ref 1–1.03)
URINE LEUKOCYTE ESTERASE: (no result)
VIT B12 SERPL-MCNC: 416 PG/ML (ref 193–986)
WBC # BLD AUTO: 8.6 TH/MM3 (ref 4–11)

## 2018-04-09 PROCEDURE — 95819 EEG AWAKE AND ASLEEP: CPT

## 2018-04-09 PROCEDURE — 83735 ASSAY OF MAGNESIUM: CPT

## 2018-04-09 PROCEDURE — P9612 CATHETERIZE FOR URINE SPEC: HCPCS

## 2018-04-09 PROCEDURE — 93005 ELECTROCARDIOGRAM TRACING: CPT

## 2018-04-09 PROCEDURE — 96361 HYDRATE IV INFUSION ADD-ON: CPT

## 2018-04-09 PROCEDURE — 82550 ASSAY OF CK (CPK): CPT

## 2018-04-09 PROCEDURE — 82140 ASSAY OF AMMONIA: CPT

## 2018-04-09 PROCEDURE — 83605 ASSAY OF LACTIC ACID: CPT

## 2018-04-09 PROCEDURE — 82552 ASSAY OF CPK IN BLOOD: CPT

## 2018-04-09 PROCEDURE — 80053 COMPREHEN METABOLIC PANEL: CPT

## 2018-04-09 PROCEDURE — 82607 VITAMIN B-12: CPT

## 2018-04-09 PROCEDURE — 81001 URINALYSIS AUTO W/SCOPE: CPT

## 2018-04-09 PROCEDURE — 70450 CT HEAD/BRAIN W/O DYE: CPT

## 2018-04-09 PROCEDURE — 82746 ASSAY OF FOLIC ACID SERUM: CPT

## 2018-04-09 PROCEDURE — 71045 X-RAY EXAM CHEST 1 VIEW: CPT

## 2018-04-09 PROCEDURE — 80307 DRUG TEST PRSMV CHEM ANLYZR: CPT

## 2018-04-09 PROCEDURE — 87040 BLOOD CULTURE FOR BACTERIA: CPT

## 2018-04-09 PROCEDURE — 85025 COMPLETE CBC W/AUTO DIFF WBC: CPT

## 2018-04-09 PROCEDURE — 86592 SYPHILIS TEST NON-TREP QUAL: CPT

## 2018-04-09 PROCEDURE — 84443 ASSAY THYROID STIM HORMONE: CPT

## 2018-04-09 PROCEDURE — 96374 THER/PROPH/DIAG INJ IV PUSH: CPT

## 2018-04-09 PROCEDURE — 80048 BASIC METABOLIC PNL TOTAL CA: CPT

## 2018-04-09 PROCEDURE — 80076 HEPATIC FUNCTION PANEL: CPT

## 2018-04-09 PROCEDURE — 84425 ASSAY OF VITAMIN B-1: CPT

## 2018-04-09 PROCEDURE — 71046 X-RAY EXAM CHEST 2 VIEWS: CPT

## 2018-04-09 PROCEDURE — 86593 SYPHILIS TEST NON-TREP QUANT: CPT

## 2018-04-09 RX ADMIN — DONEPEZIL HYDROCHLORIDE SCH MG: 5 TABLET, FILM COATED ORAL at 21:29

## 2018-04-09 RX ADMIN — ATORVASTATIN CALCIUM SCH MG: 80 TABLET, FILM COATED ORAL at 21:29

## 2018-04-09 RX ADMIN — Medication SCH ML: at 21:30

## 2018-04-09 RX ADMIN — CARVEDILOL SCH MG: 3.12 TABLET, FILM COATED ORAL at 21:29

## 2018-04-09 NOTE — PD
HPI


Chief Complaint:  Altered Mental Status


Time Seen by Provider:  11:00


Travel History


International Travel<30 days:  No


Contact w/Intl Traveler<30days:  No


Traveled to known affect area:  No





History of Present Illness


HPI


This is a 63-year-old female who, per chart review, has a history of bipolar 

disorder diabetes, presents via EMS reportedly for altered mental status.  

Baseline mental status is unknown.  Duration of acute symptoms is unknown.  The 

patient is currently awake and alert but agitated and combative, requiring the 

use of chemical and physical restraints.  At one point she asked where her 

daughter was.  Otherwise she has been nonverbal during initial evaluation.  The 

nurse was told by paramedics that at some point recently she had her Risperdal 

dosage changed and she has been incontinent of urine for an unknown amount of 

time..  No other additional information is available at this time.  Per chart 

review she was hospitalized in June 2017 for brief psychotic disorder.





PFSH


Past Medical History


Arthritis:  No


Asthma:  Yes


Blood Disorders:  No


Bipolar Disorder:  Yes


Cancer:  No


Cardiovascular Problems:  Yes


High Cholesterol:  Yes


Diabetes:  Yes


Diminished Hearing:  No


Endocrine:  No


Gastrointestinal Disorders:  Yes


GERD:  Yes


Glaucoma:  Yes


Genitourinary:  No


Immune Disorder:  No


Musculoskeletal:  Yes


Neurologic:  Yes


Psychiatric:  Yes (BIPOLAR )


Reproductive:  No


Respiratory:  Yes


Seizures:  Yes


Sickle Cell Disease:  No


Tubal Ligation:  Yes





Past Surgical History


Abdominal Surgery:  No


Cardiac Surgery:  No


Ear Surgery:  No


Endocrine Surgery:  No


Eye Surgery:  No


Genitourinary Surgery:  No


Gynecologic Surgery:  Yes (FROM HISTORY- PATIENT UNABLE TO PROVIDE INFO)


Oral Surgery:  No


Thoracic Surgery:  No


Other Surgery:  Yes (TUBAL LIGATION 1985)





Social History


Alcohol Use:  No


Tobacco Use:  No


Substance Use:  No





Allergies-Medications


(Allergen,Severity, Reaction):  


Coded Allergies:  


     penicillin G (Unverified  Allergy, Intermediate, SWELLING, 4/9/18)


Reported Meds & Prescriptions





Reported Meds & Active Scripts


Active


Reported


Naproxen 500 Mg Tab 500 Mg PO BID PRN


Atorvastatin (Atorvastatin Calcium) 80 Mg Tab 80 Mg PO HS


Amitriptyline (Amitriptyline HCl) 100 Mg Tab 100 Mg PO HS


Xanax (Alprazolam) 1 Mg Tab 1 Mg PO DAILY PRN


Folic Acid 1 Mg Tablet 1 Mg PO DAILY


Ergocalciferol 50,000 Unit Cap 50,000 Units PO Q7D


Donepezil 10 Mg Tab 10 Mg PO HS


Coreg (Carvedilol) 3.125 Mg Tab 3.125 Mg PO BID


Risperdal (Risperidone) 1 Mg Tab 1 Mg PO BID


Metformin (Metformin HCl) 1,000 Mg Tab 1,000 Mg PO BIDPC


     With meals








Review of Systems


ROS Limitations:  Altered Mental Status


Except as stated in HPI:  all other systems reviewed are Neg





Physical Exam


Exam Limitations:  Altered Mental Status


Narrative


GENERAL: Well-developed well-nourished female who is awake but unable to follow 

commands or answer questions in regards to history of present illness.  She is 

tachycardic.


SKIN: Warm and dry.


HEAD: Atraumatic. Normocephalic. 


EYES: Pupils equal and round. No scleral icterus. No injection or drainage. 


ENT: No nasal bleeding or discharge.  Mucous membranes pink and moist.


NECK: Trachea midline. No JVD. 


CARDIOVASCULAR: Regular rate and rhythm.  No murmur appreciated.


RESPIRATORY: No accessory muscle use. Clear to auscultation. Breath sounds 

equal bilaterally. 


GASTROINTESTINAL: Abdomen soft, non-tender, nondistended. Hepatic and splenic 

margins not palpable. 


MUSCULOSKELETAL: No obvious deformities. No clubbing.  No cyanosis.  No edema. 


NEUROLOGICAL: Awake and alert. No obvious cranial nerve deficits.  Motor 

grossly within normal limits.





Data


Data


Last Documented VS





Vital Signs








  Date Time  Temp Pulse Resp B/P (MAP) Pulse Ox O2 Delivery O2 Flow Rate FiO2


 


4/9/18 12:46  105 20 131/74 (93) 99 Room Air  


 


4/9/18 11:00 100.9       








Orders





 Orders


Electrocardiogram (4/9/18 11:04)


Ammonia (4/9/18 11:04)


Complete Blood Count With Diff (4/9/18 11:04)


Comprehensive Metabolic Panel (4/9/18 11:04)


Creatine Kinase (Cpk) (4/9/18 11:04)


Thyroid Stimulating Hormone (4/9/18 11:04)


Urinalysis - C+S If Indicated (4/9/18 11:04)


Lactic Acid Sepsis Protocol (4/9/18 11:04)


Blood Culture (4/9/18 11:04)


Chest, Single Ap (4/9/18 11:04)


Ct Brain W/O Iv Contrast(Rout) (4/9/18 11:04)


Blood Glucose (4/9/18 11:04)


Ecg Monitoring (4/9/18 11:04)


Iv Access Insert/Monitor (4/9/18 11:04)


Oximetry (4/9/18 11:04)


Sodium Chloride 0.9% Flush (Ns Flush) (4/9/18 11:15)


Sodium Chlor 0.9% 1000 Ml Inj (Ns 1000 M (4/9/18 11:04)


Drug Screen, Random Urine (4/9/18 11:04)


Alcohol (Ethanol) (4/9/18 11:04)


Cath For Specimen (4/9/18 11:05)


Lorazepam Inj (Ativan Inj) (4/9/18 11:15)


Restraints Non-Violent DAMION.Q3H (4/9/18 11:09)


CKMB (4/9/18 11:22)


CKMB% (4/9/18 11:22)


Sodium Chlor 0.9% 1000 Ml Inj (Ns 1000 M (4/9/18 12:28)


Cefepime Inj (Maxipime Inj) (4/9/18 13:15)


Vancomycin Inj (Vancomycin Inj) (4/9/18 13:15)


Lactic Acid Sepsis Protocol (4/9/18 13:17)


Admit To Inpatient (4/9/18 )


Code Status (4/9/18 13:15)


Vital Signs (Adult) Q4H (4/9/18 13:15)


Activity Oob With Assistance (4/9/18 13:15)


Neuro Checks Q4H (4/9/18 13:15)


Cardiac Monitor / Telemetry DAMION.Q8H (4/9/18 13:15)


Diet Heart Healthy (4/9/18 Lunch)


Sodium Chloride 0.9% Flush (Ns Flush) (4/9/18 13:15)


Sodium Chloride 0.9% Flush (Ns Flush) (4/9/18 21:00)


Lorazepam (Ativan) (4/9/18 13:15)


Complete Blood Count With Diff (4/10/18 06:00)


Basic Metabolic Panel (Bmp) (4/10/18 06:00)


Hepatic Functional Panel (4/9/18 13:15)


Lactic Acid (4/9/18 16:00)


Magnesium (Mg) (4/9/18 13:15)


Vitamin B1 (Thiamine) (4/9/18 13:15)


Vitamin B12 (4/9/18 13:15)


Rapid Plasma Regin (Rpr) W Ttr (4/9/18 13:15)


Electrocardiogram (4/9/18 )


Scd Bilateral/Knee High DAMION.BID (4/9/18 13:15)


Inpatient Certification (4/9/18 )


Consult Psychiatry (4/9/18 )


Admit Order (Ed Use Only) (4/9/18 13:27)





Labs





Laboratory Tests








Test


  4/9/18


11:22 4/9/18


12:00 4/9/18


12:55


 


Blood Urea Nitrogen 8 MG/DL   


 


Creatinine 1.19 MG/DL   


 


Random Glucose 190 MG/DL   


 


Total Protein 8.5 GM/DL   


 


Albumin 3.9 GM/DL   


 


Calcium Level 9.1 MG/DL   


 


Alkaline Phosphatase 122 U/L   


 


Aspartate Amino Transf


(AST/SGOT) 30 U/L 


  


  


 


 


Alanine Aminotransferase


(ALT/SGPT) 18 U/L 


  


  


 


 


Total Bilirubin 0.7 MG/DL   


 


Sodium Level 137 MEQ/L   


 


Potassium Level 4.0 MEQ/L   


 


Chloride Level 102 MEQ/L   


 


Carbon Dioxide Level 17.8 MEQ/L   


 


Anion Gap 17 MEQ/L   


 


Estimat Glomerular Filtration


Rate 55 ML/MIN 


  


  


 


 


Lactic Acid Level 7.8 mmol/L   


 


Ammonia 75 MCMOL/L   


 


Total Creatine Kinase 478 U/L   


 


Creatine Kinase MB 10.8 NG/ML   


 


Creatine Kinase MB % 2.3 %   


 


Thyroid Stimulating Hormone


3rd Gen 0.565 uIU/ML 


  


  


 


 


Ethyl Alcohol Level


  LESS THAN 3


MG/DL 


  


 


 


Urine Color  LIGHT-YELLOW  


 


Urine Turbidity  CLEAR  


 


Urine pH  7.0  


 


Urine Specific Gravity  1.011  


 


Urine Protein  TRACE mg/dL  


 


Urine Glucose (UA)  70 mg/dL  


 


Urine Ketones  40 mg/dL  


 


Urine Occult Blood  NEG  


 


Urine Nitrite  NEG  


 


Urine Bilirubin  NEG  


 


Urine Urobilinogen


  


  LESS THAN 2.0


MG/DL 


 


 


Urine Leukocyte Esterase  NEG  


 


Urine RBC  2 /hpf  


 


Urine WBC  1 /hpf  


 


Microscopic Urinalysis Comment


  


  CATH-CULT NOT


IND 


 


 


Urine Opiates Screen  NEG  


 


Urine Barbiturates Screen  NEG  


 


Urine Amphetamines Screen  NEG  


 


Urine Benzodiazepines Screen  POS  


 


Urine Cocaine Screen  NEG  


 


Urine Cannabinoids Screen  NEG  


 


White Blood Count   8.6 TH/MM3 


 


Red Blood Count   4.64 MIL/MM3 


 


Hemoglobin   12.5 GM/DL 


 


Hematocrit   38.6 % 


 


Mean Corpuscular Volume   83.2 FL 


 


Mean Corpuscular Hemoglobin   27.0 PG 


 


Mean Corpuscular Hemoglobin


Concent 


  


  32.4 % 


 


 


Red Cell Distribution Width   14.4 % 


 


Platelet Count   328 TH/MM3 


 


Mean Platelet Volume   7.6 FL 


 


Neutrophils (%) (Auto)   80.4 % 


 


Lymphocytes (%) (Auto)   13.2 % 


 


Monocytes (%) (Auto)   5.8 % 


 


Eosinophils (%) (Auto)   0.1 % 


 


Basophils (%) (Auto)   0.5 % 


 


Neutrophils # (Auto)   6.9 TH/MM3 


 


Lymphocytes # (Auto)   1.1 TH/MM3 


 


Monocytes # (Auto)   0.5 TH/MM3 


 


Eosinophils # (Auto)   0.0 TH/MM3 


 


Basophils # (Auto)   0.0 TH/MM3 


 


CBC Comment   DIFF FINAL 


 


Differential Comment    











MDM


Medical Decision Making


Medical Screen Exam Complete:  Yes


Emergency Medical Condition:  Yes


Medical Record Reviewed:  Yes


Differential Diagnosis


Acute psychosis, encephalitis, sepsis, meningitis, UTI, pneumonia, closed head 

injury


Narrative Course


The patient was placed on ECG monitoring pulse oximetry.  She was placed in 

soft restraints.  2 mg of Ativan administered.  Lab work, chest x-ray, CT brain

, blood cultures, urinalysis have been ordered.





1235: The patient's family members have arrived-the patient's , son and 

daughter.  They report that the patient started acting somewhat disoriented 

yesterday evening.  They also believe that there may have been a recent 

medication change, risperidone, although they are uncertain when that would 

have been started.  No additional history is available.





Lab work is been reviewed.  Lactic acid is markedly elevated at 7.8, additional 

liter fluid boluses been ordered.  Ammonia is elevated at 75.  Creatinine is 

1.19, GFR is 55, anion gap 17, glucose 190, urinalysis reveals 70 glucose, 40 

ketones.  CT the brain is normal.  The patient became more appropriate 

throughout her ED course.  Given fever, tachycardia, lactic acidosis, no source 

of infection, Dr. Qiu discussed the possibility of lumbar puncture however 

family was hesitant about this and therefore it will be deferred.  The patient 

was given broad-spectrum antibiotics.  She was admitted to Dr. Coates.





Sepsis Criteria


SIRS Criteria (2 or more):  Temp > 100.9 or < 96.8, Heart rate over 90


Septic Shock Criteria:  Lactic acid >=4





Diagnosis





 Primary Impression:  


 Hyperammonemia


 Additional Impressions:  


 SIRS (systemic inflammatory response syndrome)


 Lactic acidosis





Admitting Information


Admitting Physician Requests:  Admit











Adelso Patel Apr 9, 2018 11:12

## 2018-04-09 NOTE — EKG
Date Performed: 04/09/2018       Time Performed: 11:45:40

 

PTAGE:      63 years

 

EKG:      SINUS TACHYCARDIA POSSIBLE RIGHT VENTRICULAR CONDUCTION DELAY MODERATE ST DEPRESSION ABNORM
AL ECG Compared to prior electrocardiogram, rate has increased And nonspecific STT wave changes with 
right ventricular conduction delay are present. 

 

 PREVIOUS TRACING            : 09/11/2009 16.51

 

DOCTOR:   Wei Hernandez  Interpretating Date/Time  04/09/2018 14:58:33

## 2018-04-09 NOTE — RADRPT
EXAM DATE/TIME:  04/09/2018 12:50 

 

[facility] COMPARISON:     

No previous studies available for comparison.

 

                     

INDICATIONS :     

Fever.

                     

 

MEDICAL HISTORY :     

None.          

 

SURGICAL HISTORY :     

None.   

 

ENCOUNTER:     

Initial                                        

 

ACUITY:     

1 day      

 

PAIN SCORE:     

0/10

 

LOCATION:     

Bilateral chest 

 

FINDINGS:     

Platelike airspace disease in the left lower lung zone with slight volume loss. Cardiomediastinal con
tours are within normal limits. Bony thorax is intact.

 

CONCLUSION:     

1. Platelike airspace disease with associated mild volume loss in the left lower lobe most consistent
 with atelectasis.

 

 

 

 Jae Quintero MD on April 09, 2018 at 13:32           

Board Certified Radiologist.

 This report was verified electronically.

## 2018-04-09 NOTE — HHI.HP
HPI


Service


[facility]


Primary Care Physician


Unknown


Admission Diagnosis





SIRS, lactic acidosis, hyperammonemia


Chief Complaint:  


AMS


Travel History


International Travel<30 Days:  No


Contact w/Intl Traveler <30 Da:  No


Traveled to Known Affected Are:  No


History of Present Illness


Patient is a pleasant and cooperative 63-year-old female who was brought to the 

ER by her family due to altered mental status.


The family reports that the patient recently had a change in her medications.  

The patient has a history of bipolar disorder.  Patient sees a private 

psychiatrist, . 


Per family, patient's Risperdal was recently increased, and the patient has 

become lethargic over the last few days to the point where she was rarely 

getting out of bed and difficult to rouse. 





I have reviewed the patient's outpatient records in the [facility] EHR .  

Unfortunately, it appears that the patient is likely new to [facility] and there are 

no records for me to review in the [facility] EHR. 


I have reviewed the patient's previous records at [place].  Patient was most 

recently admitted under [place] act for psychosis 2017.  At that time 

patient was attended by psychiatry, Dr. Stephon Dominguez. 





Patient now appears much different from the ER's report of Ms. Carpenter 

clinical status upon arrival.  Patient is able to answer questions appropriately

, but not a wonderful historian.  Patient appears nontoxic, and the physical 

examination is unremarkable.  Specifically, patient does NOT appear septic.  

Patient actually has no acute medical complaints at this time.  I will NOT 

resume patient's reported psychiatric medications at this time, since it 

appears she may have been overmedicated.  I have placed consultation with 

psychiatry.  I await with interest psychiatry consultation.








Patient denies fever or chills.  Patient denies cough, shortness of breath, or 

wheezing.  Patient denies sick contacts or recent travel.


Patient denies complaint of neck stiffness or headache.





Review of Systems


Constitutional:  DENIES: Diaphoretic episodes, Fatigue, Fever, Weight gain, 

Weight loss, Chills, Dizziness, Change in appetite, Night Sweats


Endocrine:  DENIES: Heat/cold intolerance, Polydipsia, Polyuria, Polyphagia


Eyes:  DENIES: Blurred vision, Diplopia, Eye inflammation, Eye pain, Vision loss

, Photosensitivity, Double Vision


Ears, nose, mouth, throat:  DENIES: Tinnitus, Hearing loss, Vertigo, Nasal 

discharge, Oral lesions, Throat pain, Hoarseness, Ear Pain, Running Nose, 

Epistaxis, Sinus Pain, Toothache, Odynophagia


Respiratory:  DENIES: Apneas, Cough, Snoring, Wheezing, Hemoptysis, Sputum 

production, Shortness of breath


Cardiovascular:  DENIES: Chest pain, Palpitations, Syncope, Dyspnea on Exertion

, PND, Lower Extremity Edema, Orthopnea, Claudication


Gastrointestinal:  DENIES: Abdominal pain, Black stools, Bloody stools, BRB per 

rectum, Constipation, Diarrhea, GERD, Nausea, Reflux, Vomiting, Difficulty 

Swallowing, Anorexia


Genitourinary:  DENIES: Urinary frequency, Urinary incontinence, Urgency, 

Hematuria, Dysuria, Nocturia


Musculoskeletal:  DENIES: Joint pain, Muscle aches, Stiffness, Joint Swelling, 

Back pain, Neck pain


Integumentary:  DENIES: Abnormal pigmentation, Pruritus, Rash, Nail changes, 

Breast masses, Breast skin changes, Nipple discharge


Hematologic/lymphatic:  DENIES: Bruising, Lymphadenopathy


Immunologic/allergic:  DENIES: Eczema, Urticaria


Neurologic:  DENIES: Abnormal gait, Headache, Localized weakness, Paresthesias, 

Seizures, Speech Problems, Tremor, Poor Balance


Psychiatric:  COMPLAINS OF: History of Bipolar





Past Family Social History


Past Medical History


1) bipolar disorder 


2) prior hospitalization for psychosis


3) hypertension


4) diabetes


5) seizure disorder?


6) memory loss, dementia?


Past Surgical History


1) tubal ligation, 


Allergies:  


Coded Allergies:  


     penicillin G (Unverified  Allergy, Intermediate, SWELLING, 18)


Active Ordered Medications





Last Impressions








Head CT 18 1104 Signed





Impressions: 





 Service Date/Time:  2018 12:21 - CONCLUSION: Negative for an 





 acute process    Kulwant Ramachandran MD  FACR


 


Chest X-Ray 18 1104 Signed





Impressions: 





 Service Date/Time:  2018 12:50 - CONCLUSION:  1. Platelike 





 airspace disease with associated mild volume loss in the left lower lobe most 





 consistent with atelectasis.     Jae Quintero MD 








Family History


Noncontributory


Social History


- 


- Patient lives with  and daughter


- No tobacco


- No alcohol


- No illicit street drugs





Physical Exam


Vital Signs





Vital Signs








  Date Time  Temp Pulse Resp B/P (MAP) Pulse Ox O2 Delivery O2 Flow Rate FiO2


 


18 16:02  97 20 121/75 (90) 100 Room Air  


 


18 13:55 98.4 86 20 114/71 (85) 100 Room Air  


 


18 12:46  105 20 131/74 (93) 99 Room Air  


 


18 11:00 100.9 145 20 168/130 (143) 100 Room Air  


 


18 11:00   20  100 Room Air  


 


18 10:49 100.9 145 20 168/130 (143) 98   








Physical Exam


GENERAL: This is a well-nourished, well-developed patient, in no apparent 

distress.


SKIN: No rashes, ecchymoses or lesions. Cool and dry.


HEAD: Atraumatic. Normocephalic. No temporal or scalp tenderness.


EYES: Pupils equal round and reactive. Extraocular motions intact. No scleral 

icterus. No injection or drainage. 


ENT: Nose without bleeding, purulent drainage or septal hematoma. Throat 

without erythema, tonsillar hypertrophy or exudate. Uvula midline. Airway 

patent.


NECK: Trachea midline. No JVD or lymphadenopathy. Supple, nontender, no 

meningeal signs.


CARDIOVASCULAR: Regular rate and rhythm without murmurs, gallops, or rubs. 


RESPIRATORY: Clear to auscultation. Breath sounds equal bilaterally. No wheezes

, rales, or rhonchi.  


GASTROINTESTINAL: Abdomen soft, non-tender, nondistended. No hepato-splenomegaly

, or palpable masses. No guarding.


MUSCULOSKELETAL: Extremities without clubbing, cyanosis, or edema. No joint 

tenderness, effusion, or edema noted. No calf tenderness. Negative Homans sign 

bilaterally.


NEUROLOGICAL: Awake and alert. Cranial nerves II through XII intact.  Motor and 

sensory grossly within normal limits. Five out of 5 muscle strength in all 

muscle groups.  Normal speech.


Laboratory





Laboratory Tests








Test


  18


11:22 18


12:00 18


12:55 18


13:35


 


Blood Urea Nitrogen 8    


 


Creatinine 1.19    


 


Random Glucose 190    


 


Total Protein 8.5    


 


Albumin 3.9    


 


Calcium Level 9.1    


 


Alkaline Phosphatase 122    


 


Aspartate Amino Transf


(AST/SGOT) 30 


  


  


  


 


 


Alanine Aminotransferase


(ALT/SGPT) 18 


  


  


  


 


 


Total Bilirubin 0.7    


 


Sodium Level 137    


 


Potassium Level 4.0    


 


Chloride Level 102    


 


Carbon Dioxide Level 17.8    


 


Anion Gap 17    


 


Estimat Glomerular Filtration


Rate 55 


  


  


  


 


 


Lactic Acid Level 7.8    2.5 


 


Ammonia 75    


 


Total Creatine Kinase 478    


 


Creatine Kinase MB 10.8    


 


Creatine Kinase MB % 2.3    


 


Thyroid Stimulating Hormone


3rd Gen 0.565 


  


  


  


 


 


Ethyl Alcohol Level LESS THAN 3    


 


Urine Color  LIGHT-YELLOW   


 


Urine Turbidity  CLEAR   


 


Urine pH  7.0   


 


Urine Specific Gravity  1.011   


 


Urine Protein  TRACE   


 


Urine Glucose (UA)  70   


 


Urine Ketones  40   


 


Urine Occult Blood  NEG   


 


Urine Nitrite  NEG   


 


Urine Bilirubin  NEG   


 


Urine Urobilinogen  LESS THAN 2.0   


 


Urine Leukocyte Esterase  NEG   


 


Urine RBC  2   


 


Urine WBC  1   


 


Microscopic Urinalysis Comment


  


  CATH-CULT NOT


IND 


  


 


 


Urine Opiates Screen  NEG   


 


Urine Barbiturates Screen  NEG   


 


Urine Amphetamines Screen  NEG   


 


Urine Benzodiazepines Screen  POS   


 


Urine Cocaine Screen  NEG   


 


Urine Cannabinoids Screen  NEG   


 


White Blood Count   8.6  


 


Red Blood Count   4.64  


 


Hemoglobin   12.5  


 


Hematocrit   38.6  


 


Mean Corpuscular Volume   83.2  


 


Mean Corpuscular Hemoglobin   27.0  


 


Mean Corpuscular Hemoglobin


Concent 


  


  32.4 


  


 


 


Red Cell Distribution Width   14.4  


 


Platelet Count   328  


 


Mean Platelet Volume   7.6  


 


Neutrophils (%) (Auto)   80.4  


 


Lymphocytes (%) (Auto)   13.2  


 


Monocytes (%) (Auto)   5.8  


 


Eosinophils (%) (Auto)   0.1  


 


Basophils (%) (Auto)   0.5  


 


Neutrophils # (Auto)   6.9  


 


Lymphocytes # (Auto)   1.1  


 


Monocytes # (Auto)   0.5  


 


Eosinophils # (Auto)   0.0  


 


Basophils # (Auto)   0.0  


 


CBC Comment   DIFF FINAL  


 


Differential Comment     


 


Test


  18


14:49 


  


  


 


 


Magnesium Level 1.9    


 


Total Bilirubin 0.5    


 


Direct Bilirubin 0.2    


 


Indirect Bilirubin 0.3    


 


Aspartate Amino Transf


(AST/SGOT) 26 


  


  


  


 


 


Alanine Aminotransferase


(ALT/SGPT) 15 


  


  


  


 


 


Alkaline Phosphatase 107    


 


Total Protein 7.5    


 


Albumin 3.3    


 


Vitamin B12 Level 416    














 Date/Time


Source Procedure


Growth Status


 


 


 18 11:22


Blood Peripheral Aerobic Blood Culture


Pending Received


 


 18 11:22


Blood Peripheral Anaerobic Blood Culture


Pending Received








Result Diagram:  


18 1255                                                                    

            18 1122





Imaging





Last Impressions








Head CT 18 1104 Signed





Impressions: 





 Service Date/Time:  2018 12:21 - CONCLUSION: Negative for an 





 acute process    Kulwant Ramachandran MD  FACR


 


Chest X-Ray 18 1104 Signed





Impressions: 





 Service Date/Time:  2018 12:50 - CONCLUSION:  1. Platelike 





 airspace disease with associated mild volume loss in the left lower lobe most 





 consistent with atelectasis.     Jae Quintero MD 











Septic Shock Reassessment


Septic shock perfusion:  reassessment completed





Caprini VTE Risk Assessment


Caprini VTE Risk Assessment:  No/Low Risk (score <= 1)


Caprini Risk Assessment Model











 Point Value = 1          Point Value = 2  Point Value = 3  Point Value = 5


 


Age 41-60


Minor surgery


BMI > 25 kg/m2


Swollen legs


Varicose veins


Pregnancy or postpartum


History of unexplained or recurrent


   spontaneous 


Oral contraceptives or hormone


   replacement


Sepsis (< 1 month)


Serious lung disease, including


   pneumonia (< 1 month)


Abnormal pulmonary function


Acute myocardial infarction


Congestive heart failure (< 1 month)


History of inflammatory bowel disease


Medical patient at bed rest Age 61-74


Arthroscopic surgery


Major open surgery (> 45 min)


Laparoscopic surgery (> 45 min)


Malignancy


Confined to bed (> 72 hours)


Immobilizing plaster cast


Central venous access Age >= 75


History of VTE


Family history of VTE


Factor V Leiden


Prothrombin 30398A


Lupus anticoagulant


Anticardiolipin antibodies


Elevated serum homocysteine


Heparin-induced thrombocytopenia


Other congenital or acquired


   thrombophilia Stroke (< 1 month)


Elective arthroplasty


Hip, pelvis, or leg fracture


Acute spinal cord injury (< 1 month)








Prophylaxis Regimen











   Total Risk


Factor Score Risk Level Prophylaxis Regimen


 


0-1      Low Early ambulation


 


2 Moderate Order ONE of the following:


*Sequential Compression Device (SCD)


*Heparin 5000 units SQ BID


 


3-4 Higher Order ONE of the following medications:


*Heparin 5000 units SQ TID


*Enoxaparin/Lovenox 40 mg SQ daily (WT < 150 kg, CrCl > 30 mL/min)


*Enoxaparin/Lovenox 30 mg SQ daily (WT < 150 kg, CrCl > 10-29 mL/min)


*Enoxaparin/Lovenox 30 mg SQ BID (WT < 150 kg, CrCl > 30 mL/min)


AND/OR


*Sequential Compression Device (SCD)


 


5 or more Highest Order ONE of the following medications:


*Heparin 5000 units SQ TID (Preferred with Epidurals)


*Enoxaparin/Lovenox 40 mg SQ daily (WT < 150 kg, CrCl > 30 mL/min)


*Enoxaparin/Lovenox 30 mg SQ daily (WT < 150 kg, CrCl > 10-29 mL/min)


*Enoxaparin/Lovenox 30 mg SQ BID (WT < 150 kg, CrCl > 30 mL/min)


AND


*Sequential Compression Device (SCD)











Assessment and Plan


Problem List:  


(1) Altered mental status


ICD Codes:  R41.82 - Altered mental status, unspecified


Status:  Acute


Plan:  - Per ER report patient was lethargic upon arrival


- Per family recent medication dose change by psychiatry, Risperdal


- Outpatient records are not available to me


- Patient alert and oriented at the time of my visit, but not good historian as 

to her past medical history


- Patient does not appear septic


- IV Ativan prn agitation


- will NOT restart psychiatric medications


- Await Psychiatry consultation


- It would be helpful to have availability of patient's outpatient records.


- I will discuss case with [facility] case management, and see if outpatient records 

could be obtained from current/previous providers


And scanned into the [facility] EHR for future reference


- DVT prophylaxis


- supportive care


- repeat lactic acid in AM for completeness. 


- anticipate d/c to home in 2-3 days





(2) Bipolar disorder


ICD Codes:  F31.9 - Bipolar disorder, unspecified


Status:  Chronic


Plan:  - await input from Psychiatry





(3) HTN (hypertension)


ICD Codes:  I10 - Essential (primary) hypertension


Status:  Chronic


Plan:  - continue coreg





(4) DM2 (diabetes mellitus, type 2)


ICD Codes:  E11.9 - Type 2 diabetes mellitus without complications


Status:  Chronic


Plan:  - hold metformin


- [facility]








Physician Certification


2 Midnight Certification Type:  Admission for Inpatient Services


Order for Inpatient Services


The services are ordered in accordance with Medicare regulations or non-

Medicare payer requirements, as applicable.  In the case of services not 

specified as inpatient-only, they are appropriately provided as inpatient 

services in accordance with the 2-midnight benchmark.


Estimated LOS (days):  3


3 days is the estimated time the patient will need to remain in the hospital, 

assuming treatment plan goals are met and no additional complications.


Post-Hospital Plan:  Not yet determined





Problem Qualifiers





(1) Altered mental status:  


Qualified Codes:  R41.82 - Altered mental status, unspecified


(2) Bipolar disorder:  





(3) HTN (hypertension):  


Qualified Codes:  I10 - Essential (primary) hypertension


(4) DM2 (diabetes mellitus, type 2):  


Qualified Codes:  E11.8 - Type 2 diabetes mellitus with unspecified 

complications








Jovi Coates DO 2018 17:50

## 2018-04-09 NOTE — RADRPT
EXAM DATE/TIME:  04/09/2018 12:21 

 

[facility] COMPARISON:     CT BRAIN W/O CONTRAST, June 21, 2017, 18:45.

 

INDICATIONS :     Confusion, altered mental status

                      

RADIATION DOSE:     34.92 CTDIvol (mGy) 

 

 

MEDICAL HISTORY :     Seizures. Hypertension. Asthma, diabetes

SURGICAL HISTORY :      Tubal ligation. 

ENCOUNTER:      Initial

ACUITY:      1 day

PAIN SCALE:      Non-responsive

LOCATION:       Bilateral  head

 

TECHNIQUE:     Multiple contiguous axial images were obtained of the head.  Using automated exposure 
control and adjustment of the mA and/or kV according to patient size, radiation dose was kept as low 
as reasonably achievable to obtain optimal diagnostic quality images.   DICOM format image data is av
ailable electronically for review and comparison.  

 

FINDINGS:     

CEREBRUM:     The ventricles are normal for age.  No evidence of midline shift, mass lesion, hemorrha
ge or acute infarction.  No extra-axial fluid collections are seen.

POSTERIOR FOSSA:     The cerebellum and brainstem are intact.  The 4th ventricle is midline.  The cer
ebellopontine angle is unremarkable.

EXTRACRANIAL:     The visualized portion of the orbits is intact.

SKULL:     The calvaria is intact.  No evidence of skull fracture.

 

CONCLUSION:     Negative for an acute process 

 

 Kulwant Ramachandran MD FACR on April 09, 2018 at 12:25           

Board Certified Radiologist.

 This report was verified electronically.

## 2018-04-09 NOTE — PD
Physical Exam


Date Seen by Provider:  Apr 9, 2018


Narrative


This patient was brought to us by EVAC with a chief complaint of altered mental 

status.  She was initially here by herself and was not providing any history.  

A little while later she did start to talk and states that her mind has been 

off for the last few days.





Data


Data


Last Documented VS





Vital Signs








  Date Time  Temp Pulse Resp B/P (MAP) Pulse Ox O2 Delivery O2 Flow Rate FiO2


 


4/9/18 12:46  105 20 131/74 (93) 99 Room Air  


 


4/9/18 11:00 100.9       








Orders





 Orders


Electrocardiogram (4/9/18 11:04)


Ammonia (4/9/18 11:04)


Complete Blood Count With Diff (4/9/18 11:04)


Comprehensive Metabolic Panel (4/9/18 11:04)


Creatine Kinase (Cpk) (4/9/18 11:04)


Thyroid Stimulating Hormone (4/9/18 11:04)


Urinalysis - C+S If Indicated (4/9/18 11:04)


Lactic Acid Sepsis Protocol (4/9/18 11:04)


Blood Culture (4/9/18 11:04)


Chest, Single Ap (4/9/18 11:04)


Ct Brain W/O Iv Contrast(Rout) (4/9/18 11:04)


Blood Glucose (4/9/18 11:04)


Ecg Monitoring (4/9/18 11:04)


Iv Access Insert/Monitor (4/9/18 11:04)


Oximetry (4/9/18 11:04)


Sodium Chloride 0.9% Flush (Ns Flush) (4/9/18 11:15)


Sodium Chlor 0.9% 1000 Ml Inj (Ns 1000 M (4/9/18 11:04)


Drug Screen, Random Urine (4/9/18 11:04)


Alcohol (Ethanol) (4/9/18 11:04)


Cath For Specimen (4/9/18 11:05)


Lorazepam Inj (Ativan Inj) (4/9/18 11:15)


Restraints Non-Violent DAMION.Q3H (4/9/18 11:09)


CKMB (4/9/18 11:22)


CKMB% (4/9/18 11:22)


Sodium Chlor 0.9% 1000 Ml Inj (Ns 1000 M (4/9/18 12:28)


Cefepime Inj (Maxipime Inj) (4/9/18 13:15)


Vancomycin Inj (Vancomycin Inj) (4/9/18 13:15)


Lactic Acid Sepsis Protocol (4/9/18 13:17)


Admit To Inpatient (4/9/18 )


Code Status (4/9/18 13:15)


Vital Signs (Adult) Q4H (4/9/18 13:15)


Activity Oob With Assistance (4/9/18 13:15)


Neuro Checks Q4H (4/9/18 13:15)


Cardiac Monitor / Telemetry DAMION.Q8H (4/9/18 13:15)


Diet Heart Healthy (4/9/18 Lunch)


Sodium Chloride 0.9% Flush (Ns Flush) (4/9/18 13:15)


Sodium Chloride 0.9% Flush (Ns Flush) (4/9/18 21:00)


Lorazepam (Ativan) (4/9/18 13:15)


Complete Blood Count With Diff (4/10/18 06:00)


Basic Metabolic Panel (Bmp) (4/10/18 06:00)


Hepatic Functional Panel (4/9/18 13:15)


Lactic Acid (4/9/18 16:00)


Magnesium (Mg) (4/9/18 13:15)


Vitamin B1 (Thiamine) (4/9/18 13:15)


Vitamin B12 (4/9/18 13:15)


Rapid Plasma Regin (Rpr) W Ttr (4/9/18 13:15)


Electrocardiogram (4/9/18 )


Scd Bilateral/Knee High DAMION.BID (4/9/18 13:15)


Inpatient Certification (4/9/18 )


Consult Psychiatry (4/9/18 )





Labs





Laboratory Tests








Test


  4/9/18


11:22 4/9/18


12:00 4/9/18


12:55


 


Blood Urea Nitrogen 8 MG/DL   


 


Creatinine 1.19 MG/DL   


 


Random Glucose 190 MG/DL   


 


Total Protein 8.5 GM/DL   


 


Albumin 3.9 GM/DL   


 


Calcium Level 9.1 MG/DL   


 


Alkaline Phosphatase 122 U/L   


 


Aspartate Amino Transf


(AST/SGOT) 30 U/L 


  


  


 


 


Alanine Aminotransferase


(ALT/SGPT) 18 U/L 


  


  


 


 


Total Bilirubin 0.7 MG/DL   


 


Sodium Level 137 MEQ/L   


 


Potassium Level 4.0 MEQ/L   


 


Chloride Level 102 MEQ/L   


 


Carbon Dioxide Level 17.8 MEQ/L   


 


Anion Gap 17 MEQ/L   


 


Estimat Glomerular Filtration


Rate 55 ML/MIN 


  


  


 


 


Lactic Acid Level 7.8 mmol/L   


 


Ammonia 75 MCMOL/L   


 


Total Creatine Kinase 478 U/L   


 


Creatine Kinase MB 10.8 NG/ML   


 


Creatine Kinase MB % 2.3 %   


 


Thyroid Stimulating Hormone


3rd Gen 0.565 uIU/ML 


  


  


 


 


Ethyl Alcohol Level


  LESS THAN 3


MG/DL 


  


 


 


Urine Color  LIGHT-YELLOW  


 


Urine Turbidity  CLEAR  


 


Urine pH  7.0  


 


Urine Specific Gravity  1.011  


 


Urine Protein  TRACE mg/dL  


 


Urine Glucose (UA)  70 mg/dL  


 


Urine Ketones  40 mg/dL  


 


Urine Occult Blood  NEG  


 


Urine Nitrite  NEG  


 


Urine Bilirubin  NEG  


 


Urine Urobilinogen


  


  LESS THAN 2.0


MG/DL 


 


 


Urine Leukocyte Esterase  NEG  


 


Urine RBC  2 /hpf  


 


Urine WBC  1 /hpf  


 


Microscopic Urinalysis Comment


  


  CATH-CULT NOT


IND 


 


 


Urine Opiates Screen  NEG  


 


Urine Barbiturates Screen  NEG  


 


Urine Amphetamines Screen  NEG  


 


Urine Benzodiazepines Screen  POS  


 


Urine Cocaine Screen  NEG  


 


Urine Cannabinoids Screen  NEG  


 


White Blood Count   8.6 TH/MM3 


 


Red Blood Count   4.64 MIL/MM3 


 


Hemoglobin   12.5 GM/DL 


 


Hematocrit   38.6 % 


 


Mean Corpuscular Volume   83.2 FL 


 


Mean Corpuscular Hemoglobin   27.0 PG 


 


Mean Corpuscular Hemoglobin


Concent 


  


  32.4 % 


 


 


Red Cell Distribution Width   14.4 % 


 


Platelet Count   328 TH/MM3 


 


Mean Platelet Volume   7.6 FL 


 


Neutrophils (%) (Auto)   80.4 % 


 


Lymphocytes (%) (Auto)   13.2 % 


 


Monocytes (%) (Auto)   5.8 % 


 


Eosinophils (%) (Auto)   0.1 % 


 


Basophils (%) (Auto)   0.5 % 


 


Neutrophils # (Auto)   6.9 TH/MM3 


 


Lymphocytes # (Auto)   1.1 TH/MM3 


 


Monocytes # (Auto)   0.5 TH/MM3 


 


Eosinophils # (Auto)   0.0 TH/MM3 


 


Basophils # (Auto)   0.0 TH/MM3 


 


CBC Comment   DIFF FINAL 


 


Differential Comment    











MDM


Supervised Visit with LEONIDAS:  Yes


Narrative Course


I, Dr. Qiu, have reviewed the advance practice practitioner's documentation 

and am in agreement, met with the patient face to face, made the diagnosis, and 

the medical decision making was done by me.  





*My assessment and Findings: 








Vital Signs








  Date Time  Temp Pulse Resp B/P (MAP) Pulse Ox O2 Delivery O2 Flow Rate FiO2


 


4/9/18 12:46  105 20 131/74 (93) 99 Room Air  


 


4/9/18 11:00 100.9 145 20 168/130 (143) 100 Room Air  


 


4/9/18 11:00   20  100 Room Air  


 


4/9/18 10:49 100.9 145 20 168/130 (143) 98   











A septic workup was initiated based upon her initial vital signs.





BMP Diagram


4/9/18 11:22








Total Protein 8.5 H, Albumin 3.9, Calcium Level 9.1, Alkaline Phosphatase 122 H

, Aspartate Amino Transf (AST/SGOT) 30, Alanine Aminotransferase (ALT/SGPT) 18, 

Total Bilirubin 0.7








Tox screen is positive for benzodiazepines.  The patient was given 

benzodiazepines in the emergency department.





UA is negative for infection.





Lactic acid level is 7.8.





CT of the head is negative.  Chest x-ray is negative for infiltrate to my 

interpretation.





The patient's blood has been re-collected for her CBC.





I have discussed spinal tap with the patient and several family members on 2 

occasions.  The patient has a daughter in the room who is extremely anxious and 

who is answering for the patient.





The patient's insurance is [employer].  I will consult Dr. Coates.  Perhaps he will 

be able to better speak with the family regarding the spinal tap.  In the 

meantime, the patient will be given broad-spectrum antibiotics.





The case has subsequently been discussed with Dr. Coates.  As the patient is 

now lucid and has a supple neck, LP will be deferred.  Dr. Coates will 

reassess the situation later this afternoon.  The patient has been covered for 

possible meningitis with cefepime and vancomycin.


Critical Care Narrative


Aggregate critical care time was 45 minutes. Time to perform other separately 

billable procedures was not  


included in the critical care time. My time did not include minutes spent 

treating any other patients simultaneously or on  


activities that did not directly contribute to the patient's treatment.  





The services I provided to this patient were to treat and/or prevent clinically 

significant deterioration due to altered mental status, sepsis





I provided critical care services requiring my management, as noted below:


Chart data review, documentation time, medication orders and management, vital 

sign assessments/reviewing monitor data,  


ordering and reviewing lab tests, ordering and interpreting/reviewing x-rays 

and diagnostic studies, care of the patient  


and discussion of the patient with the admitting physicians











Kiana Qiu MD Apr 9, 2018 13:13

## 2018-04-10 VITALS
DIASTOLIC BLOOD PRESSURE: 65 MMHG | RESPIRATION RATE: 20 BRPM | HEART RATE: 85 BPM | TEMPERATURE: 98.5 F | SYSTOLIC BLOOD PRESSURE: 134 MMHG | OXYGEN SATURATION: 99 %

## 2018-04-10 VITALS
RESPIRATION RATE: 16 BRPM | DIASTOLIC BLOOD PRESSURE: 56 MMHG | HEART RATE: 90 BPM | OXYGEN SATURATION: 99 % | TEMPERATURE: 98.7 F | SYSTOLIC BLOOD PRESSURE: 118 MMHG

## 2018-04-10 VITALS
RESPIRATION RATE: 20 BRPM | DIASTOLIC BLOOD PRESSURE: 58 MMHG | HEART RATE: 83 BPM | SYSTOLIC BLOOD PRESSURE: 118 MMHG | TEMPERATURE: 98.3 F | OXYGEN SATURATION: 100 %

## 2018-04-10 VITALS — HEART RATE: 93 BPM

## 2018-04-10 VITALS
RESPIRATION RATE: 20 BRPM | DIASTOLIC BLOOD PRESSURE: 59 MMHG | TEMPERATURE: 97.9 F | OXYGEN SATURATION: 97 % | HEART RATE: 86 BPM | SYSTOLIC BLOOD PRESSURE: 118 MMHG

## 2018-04-10 VITALS
SYSTOLIC BLOOD PRESSURE: 102 MMHG | OXYGEN SATURATION: 100 % | DIASTOLIC BLOOD PRESSURE: 52 MMHG | HEART RATE: 88 BPM | RESPIRATION RATE: 16 BRPM | TEMPERATURE: 99 F

## 2018-04-10 VITALS
RESPIRATION RATE: 16 BRPM | SYSTOLIC BLOOD PRESSURE: 169 MMHG | TEMPERATURE: 98.6 F | DIASTOLIC BLOOD PRESSURE: 97 MMHG | HEART RATE: 97 BPM | OXYGEN SATURATION: 100 %

## 2018-04-10 VITALS
TEMPERATURE: 98.8 F | RESPIRATION RATE: 16 BRPM | OXYGEN SATURATION: 100 % | HEART RATE: 101 BPM | DIASTOLIC BLOOD PRESSURE: 83 MMHG | SYSTOLIC BLOOD PRESSURE: 169 MMHG

## 2018-04-10 VITALS — HEART RATE: 86 BPM

## 2018-04-10 LAB
BASOPHILS # BLD AUTO: 0 TH/MM3 (ref 0–0.2)
BASOPHILS NFR BLD: 0.4 % (ref 0–2)
BUN SERPL-MCNC: 11 MG/DL (ref 7–18)
CALCIUM SERPL-MCNC: 8.1 MG/DL (ref 8.5–10.1)
CHLORIDE SERPL-SCNC: 111 MEQ/L (ref 98–107)
CREAT SERPL-MCNC: 0.99 MG/DL (ref 0.5–1)
EOSINOPHIL # BLD: 0.1 TH/MM3 (ref 0–0.4)
EOSINOPHIL NFR BLD: 2 % (ref 0–4)
ERYTHROCYTE [DISTWIDTH] IN BLOOD BY AUTOMATED COUNT: 14.4 % (ref 11.6–17.2)
GFR SERPLBLD BASED ON 1.73 SQ M-ARVRAT: 68 ML/MIN (ref 89–?)
GLUCOSE SERPL-MCNC: 152 MG/DL (ref 74–106)
HCO3 BLD-SCNC: 23.4 MEQ/L (ref 21–32)
HCT VFR BLD CALC: 36.1 % (ref 35–46)
HGB BLD-MCNC: 11.7 GM/DL (ref 11.6–15.3)
LYMPHOCYTES # BLD AUTO: 2.9 TH/MM3 (ref 1–4.8)
LYMPHOCYTES NFR BLD AUTO: 39.8 % (ref 9–44)
Lab: (no result)
MCH RBC QN AUTO: 27.2 PG (ref 27–34)
MCHC RBC AUTO-ENTMCNC: 32.5 % (ref 32–36)
MCV RBC AUTO: 83.7 FL (ref 80–100)
MONOCYTE #: 0.6 TH/MM3 (ref 0–0.9)
MONOCYTES NFR BLD: 7.9 % (ref 0–8)
NEUTROPHILS # BLD AUTO: 3.6 TH/MM3 (ref 1.8–7.7)
NEUTROPHILS NFR BLD AUTO: 49.9 % (ref 16–70)
PLATELET # BLD: 314 TH/MM3 (ref 150–450)
PMV BLD AUTO: 7.8 FL (ref 7–11)
RBC # BLD AUTO: 4.31 MIL/MM3 (ref 4–5.3)
SODIUM SERPL-SCNC: 142 MEQ/L (ref 136–145)
WBC # BLD AUTO: 7.2 TH/MM3 (ref 4–11)

## 2018-04-10 RX ADMIN — DONEPEZIL HYDROCHLORIDE SCH MG: 5 TABLET, FILM COATED ORAL at 21:32

## 2018-04-10 RX ADMIN — CARVEDILOL SCH MG: 3.12 TABLET, FILM COATED ORAL at 21:32

## 2018-04-10 RX ADMIN — ATORVASTATIN CALCIUM SCH MG: 80 TABLET, FILM COATED ORAL at 21:32

## 2018-04-10 RX ADMIN — INSULIN ASPART SCH: 100 INJECTION, SOLUTION INTRAVENOUS; SUBCUTANEOUS at 16:29

## 2018-04-10 RX ADMIN — Medication SCH ML: at 21:33

## 2018-04-10 RX ADMIN — Medication SCH ML: at 09:00

## 2018-04-10 RX ADMIN — CARVEDILOL SCH MG: 3.12 TABLET, FILM COATED ORAL at 09:03

## 2018-04-10 RX ADMIN — INSULIN ASPART SCH: 100 INJECTION, SOLUTION INTRAVENOUS; SUBCUTANEOUS at 12:53

## 2018-04-10 RX ADMIN — INSULIN ASPART SCH: 100 INJECTION, SOLUTION INTRAVENOUS; SUBCUTANEOUS at 21:48

## 2018-04-10 NOTE — PD.PSY.CON
Provisional Diagnosis


Admission Date


Apr 9, 2018 at 13:29


Axis I.


Unspecified psychosis vs delirium due to another underlying medical condition


Axis II.


Deferred


Axis III.


Asthma, diabetes





History of Present Illness


Service


Psychiatry


Consult Requested By


Medicine


Reason for Consult


Agitation


Primary Care Physician


Unknown


HPI


The patient is a 63-year-old -American woman, domiciled in [facility] with 

her , unemployed, supported by Social Security, with psychiatric history 

of mild dementia, bipolar depression, previous psychiatric hospitalizations, no 

previous suicide attempts, she was hospitalized here in [place] in 2017 due to 

a brief psychotic episode under the care of Dr. Dominguez, documentation reviewed

, the patient has an established outpatient psychiatric care with Dr. Curiel, she 

is on Xanax 1 mg 3 times daily, amitriptyline 100 mg at bedtime, risperidone 1 

mg twice daily, Aricept 10 mg, medical history of asthma and diabetes who was 

brought to the ER by her family due to altered mental status.The family reports 

that the patient recently had a change in her medications.  Per family, patient'

s Risperdal was recently increased, and the patient has become lethargic over 

the last few days to the point where she was rarely getting out of bed and 

difficult to rouse.  She was consulted to psychiatry to address agitation and 

medication management.  On psychiatric evaluation today I find a patient with 

calm, cooperative and pleasant.  The patient reports good mood, rates her mood 

as 9/10.  The patient denies depressive symptoms, denies anhedonia, denies 

hopelessness, denies helplessness, denies problems with sleep, problems with 

appetite.  He denies suicidal and homicidal ideation, she denies visual and 

auditory hallucinations.  Patient is fully oriented 3, with good recent and 

immediate recall, executive, language and abstraction at the moment of this 

evaluation.  The patient reports that she cannot understand what happened with 

her, "this is not the first time this happened to me".  She denies the use of 

illegal drugs, denies the use of alcohol.  She denies overusing her 

psychotropics.





Review of Systems


Constitutional:  DENIES: Diaphoretic episodes, Fatigue, Fever, Weight gain, 

Weight loss, Chills, Dizziness, Change in appetite, Night Sweats


Endocrine:  DENIES: Abnorml menstrual pattern, Heat/cold intolerance, Polydipsia

, Polyuria, Polyphagia


Eyes:  DENIES: Blurred vision, Diplopia, Eye inflammation, Eye pain, Vision loss

, Photosensitivity, Double Vision


Ears, nose, mouth, throat:  DENIES: Tinnitus, Hearing loss, Vertigo, Nasal 

discharge, Oral lesions, Throat pain, Hoarseness, Ear Pain, Running Nose, 

Epistaxis, Sinus Pain, Toothache, Odynophagia


Respiratory:  DENIES: Apneas, Cough, Snoring, Wheezing, Hemoptysis, Sputum 

production, Shortness of breath


Cardiovascular:  DENIES: Chest pain, Palpitations, Syncope, Dyspnea on Exertion

, PND, Lower Extremity Edema, Orthopnea, Claudication


Gastrointestinal:  DENIES: Abdominal pain, Black stools, Bloody stools, 

Constipation, Diarrhea, Nausea, Vomiting, Difficulty Swallowing, Anorexia


Genitourinary:  DENIES: Abnormal vaginal bleeding, Dysmenorrhea, Dyspareunia, 

Sexual dysfunction, Urinary frequency, Urinary incontinence, Urgency, Hematuria

, Dysuria, Nocturia, Vaginal discharge


Musculoskeletal:  DENIES: Joint pain, Muscle aches, Stiffness, Joint Swelling, 

Back pain, Neck pain


Integumentary:  DENIES: Abnormal pigmentation, Pruritus, Rash, Nail changes, 

Breast masses, Breast skin changes, Nipple discharge


Hematologic/lymphatic:  DENIES: Bruising, Lymphadenopathy


Immunologic/allergic:  DENIES: Eczema, Urticaria


Neurologic:  DENIES: Abnormal gait, Headache, Localized weakness, Paresthesias, 

Seizures, Speech Problems, Tremor, Poor Balance


Psychiatric:  DENIES: Anxiety, Confusion, Mood changes, Depression, 

Hallucinations, Agitation, Suicidal Ideation, Homicidal Ideation, Delusions





Past Family Social History


Coded Allergies:  


     penicillin G (Unverified  Allergy, Intermediate, SWELLING, 4/9/18)


Reported Medications


Naproxen (Naproxen) 500 Mg Tab, 500 MG PO BID Y for PAIN SCALE 1 TO 10, TAB 0 

Refills


   4/9/18


Atorvastatin (Atorvastatin) 80 Mg Tab, 80 MG PO HS for Cholesterol Management, 

TAB 0 Refills


   4/9/18


Amitriptyline (Amitriptyline) 100 Mg Tab, 100 MG PO HS for Control Depression, 

TAB 0 Refills


   4/9/18


Alprazolam (Xanax) 1 Mg Tab, 1 MG PO DAILY Y for ANXIETY, TAB 0 Refills


   4/9/18


Folic Acid (Folic Acid) 1 Mg Tablet, 1 MG PO DAILY


   6/21/17


Ergocalciferol (Ergocalciferol) 50,000 Unit Cap, 52363 UNITS PO Q7D for 

Nutritional Supplement, #30 CAP 0 Refills


   6/21/17


Donepezil (Donepezil) 10 Mg Tab, 10 MG PO HS for Dementia, #30 TAB 0 Refills


   6/21/17


Carvedilol (Coreg) 3.125 Mg Tab, 3.125 MG PO BID, #60 TAB 0 Refills


   6/21/17


Risperidone (Risperdal) 1 Mg Tab, 1 MG PO BID, #30 TAB 0 Refills


   6/21/17


Metformin (Metformin) 1,000 Mg Tab, 1000 MG PO BIDPC for Blood Sugar Management

, #60 TAB 0 Refills


   With meals


   6/21/17


Discontinued Reported Medications


Amitriptyline (Amitriptyline) 150 Mg Tab, 150 MG PO HS for Control Depression, #

30 TAB 0 Refills


   6/21/17


Alprazolam (Xanax) 0.5 Mg Tab, 0.5 MG PO Q6H Y for ANXIETY, TAB 0 Refills


   6/21/17


Discontinued Scripts


Amitriptyline (Amitriptyline) 75 Mg Tab, 75 MG PO HS for Control Depression, #7 

TAB 0 Refills


   Prov:Stephon Dominguez MD         6/23/17


Risperidone (Risperdal) 1 Mg Tab, 1 MG PO BID for health, #14 TAB 0 Refills


   Prov:Stephon Dominguez MD         6/23/17


Alprazolam (Xanax) 0.5 Mg Tab, 0.5 MG PO HS Y for MILD ANXIETY, #7 TAB 0 Refills


   Prov:Stephon Dominguez MD         6/23/17





Current Medications








 Medications


  (Trade)  Dose


 Ordered  Sig/Jayde


 Route  Start Time


 Stop Time Status Last Admin


 


  (NS Flush)  2 ml  UNSCH  PRN


 IV FLUSH  4/9/18 13:15


     


 


 


  (NS Flush)  2 ml  BID


 IV FLUSH  4/9/18 21:00


    4/10/18 09:00


 


 


  (Ativan)  0.5 mg  Q8H  PRN


 PO  4/9/18 14:00


     


 


 


  (Lipitor)  80 mg  HS


 PO  4/9/18 21:00


    4/9/18 21:29


 


 


  (Coreg)  3.125 mg  BID


 PO  4/9/18 21:00


    4/10/18 09:03


 


 


  (Aricept)  10 mg  HS


 PO  4/9/18 21:00


    4/9/18 21:29


 


 


  (NovoLOG


 SUPPLEMENTAL


 SCALE)  1  ACHS SLIDING  SCALE


 SQ  4/10/18 12:00


     


 


 


  (Xanax)  1 mg  DAILY  PRN


 PO  4/10/18 12:00


   UNV  


 


 


  (Elavil)  100 mg  HS


 PO  4/10/18 21:00


   UNV  


 


 


  (risperDAL)  1 mg  BID


 PO  4/10/18 21:00


   UNV  


 








Family Psych History


No family psychiatric history


Social History


Patient was born and raised in [place], she lives in [facility] with her  

and daughter, unemployed, supported by Social Security, her highest level of 

education is 2 year college


Patient's Strengths (min. 2)


Family support, outpatient psychiatric





Physical Exam


No tremors, no EPS, no stiffness, no withdrawal symptoms, no psychomotor 

agitation or retardation


Vital Signs





Vital Signs








  Date Time  Temp Pulse Resp B/P (MAP) Pulse Ox O2 Delivery O2 Flow Rate FiO2


 


4/10/18 08:00 98.3 83 20 118/58 (78) 100   


 


4/10/18 07:00      Room Air  








Lab Results











Test


  4/9/18


12:00 4/9/18


12:55 4/9/18


13:35 4/9/18


14:49


 


Urine Color LIGHT-YELLOW    


 


Urine Turbidity CLEAR    


 


Urine pH 7.0    


 


Urine Specific Gravity 1.011    


 


Urine Protein TRACE mg/dL    


 


Urine Glucose (UA) 70 mg/dL    


 


Urine Ketones 40 mg/dL    


 


Urine Occult Blood NEG    


 


Urine Nitrite NEG    


 


Urine Bilirubin NEG    


 


Urine Urobilinogen


  LESS THAN 2.0


MG/DL 


  


  


 


 


Urine Leukocyte Esterase NEG    


 


Urine RBC 2 /hpf    


 


Urine WBC 1 /hpf    


 


Microscopic Urinalysis Comment


  CATH-CULT NOT


IND 


  


  


 


 


Urine Opiates Screen NEG    


 


Urine Barbiturates Screen NEG    


 


Urine Amphetamines Screen NEG    


 


Urine Benzodiazepines Screen POS    


 


Urine Cocaine Screen NEG    


 


Urine Cannabinoids Screen NEG    


 


White Blood Count  8.6 TH/MM3   


 


Red Blood Count  4.64 MIL/MM3   


 


Hemoglobin  12.5 GM/DL   


 


Hematocrit  38.6 %   


 


Mean Corpuscular Volume  83.2 FL   


 


Mean Corpuscular Hemoglobin  27.0 PG   


 


Mean Corpuscular Hemoglobin


Concent 


  32.4 % 


  


  


 


 


Red Cell Distribution Width  14.4 %   


 


Platelet Count  328 TH/MM3   


 


Mean Platelet Volume  7.6 FL   


 


Neutrophils (%) (Auto)  80.4 %   


 


Lymphocytes (%) (Auto)  13.2 %   


 


Monocytes (%) (Auto)  5.8 %   


 


Eosinophils (%) (Auto)  0.1 %   


 


Basophils (%) (Auto)  0.5 %   


 


Neutrophils # (Auto)  6.9 TH/MM3   


 


Lymphocytes # (Auto)  1.1 TH/MM3   


 


Monocytes # (Auto)  0.5 TH/MM3   


 


Eosinophils # (Auto)  0.0 TH/MM3   


 


Basophils # (Auto)  0.0 TH/MM3   


 


CBC Comment  DIFF FINAL   


 


Differential Comment     


 


Lactic Acid Level   2.5 mmol/L  


 


Magnesium Level    1.9 MG/DL 


 


Total Bilirubin    0.5 MG/DL 


 


Direct Bilirubin    0.2 MG/DL 


 


Indirect Bilirubin    0.3 MG/DL 


 


Aspartate Amino Transf


(AST/SGOT) 


  


  


  26 U/L 


 


 


Alanine Aminotransferase


(ALT/SGPT) 


  


  


  15 U/L 


 


 


Alkaline Phosphatase    107 U/L 


 


Total Protein    7.5 GM/DL 


 


Albumin    3.3 GM/DL 


 


Vitamin B12 Level    416 PG/ML 


 


Test


  4/9/18


18:05 4/9/18


21:20 4/10/18


06:05 4/10/18


10:41


 


Lactic Acid Level 2.8 mmol/L  3.3 mmol/L   3.7 mmol/L 


 


White Blood Count   7.2 TH/MM3  


 


Red Blood Count   4.31 MIL/MM3  


 


Hemoglobin   11.7 GM/DL  


 


Hematocrit   36.1 %  


 


Mean Corpuscular Volume   83.7 FL  


 


Mean Corpuscular Hemoglobin   27.2 PG  


 


Mean Corpuscular Hemoglobin


Concent 


  


  32.5 % 


  


 


 


Red Cell Distribution Width   14.4 %  


 


Platelet Count   314 TH/MM3  


 


Mean Platelet Volume   7.8 FL  


 


Neutrophils (%) (Auto)   49.9 %  


 


Lymphocytes (%) (Auto)   39.8 %  


 


Monocytes (%) (Auto)   7.9 %  


 


Eosinophils (%) (Auto)   2.0 %  


 


Basophils (%) (Auto)   0.4 %  


 


Neutrophils # (Auto)   3.6 TH/MM3  


 


Lymphocytes # (Auto)   2.9 TH/MM3  


 


Monocytes # (Auto)   0.6 TH/MM3  


 


Eosinophils # (Auto)   0.1 TH/MM3  


 


Basophils # (Auto)   0.0 TH/MM3  


 


CBC Comment   DIFF FINAL  


 


Differential Comment     


 


Blood Urea Nitrogen   11 MG/DL  


 


Creatinine   0.99 MG/DL  


 


Random Glucose   152 MG/DL  


 


Calcium Level   8.1 MG/DL  


 


Sodium Level   142 MEQ/L  


 


Potassium Level   3.4 MEQ/L  


 


Chloride Level   111 MEQ/L  


 


Carbon Dioxide Level   23.4 MEQ/L  


 


Anion Gap   8 MEQ/L  


 


Estimat Glomerular Filtration


Rate 


  


  68 ML/MIN 


  


 














 Date/Time


Source Procedure


Growth Status


 


 


 4/9/18 11:22


Blood Peripheral Aerobic Blood Culture - Preliminary


NO GROWTH IN 1 DAY Resulted


 


 4/9/18 11:22


Blood Peripheral Anaerobic Blood Culture - Preliminary


NO GROWTH IN 1 DAY Resulted











Mental Status Examination


Appearance:  Appropriate


Consciousness:  Alert


Orientation:  x4


Motor Activity:  Normal gait


Speech:  Unremarkable


Language:  Adequate


Fund of Knowledge:  Adequate


Attention and Concentration:  Adequate


Memory:  Unremarkable


Mood:  Appropriate


Affect:  Appropriate


Thought Process & Associations:  Intact


Thought Content:  Appropriate


Hallucination Type:  None


Delusion Type:  None


Suicidal Ideation:  No


Suicidal Plan:  No


Suicidal Intention:  No


Homicidal Ideation:  No


Homicidal Plan:  No


Homicidal Intention:  No


Insight:  Adequate


Judgment:  Adequate





Assessment & Plan


Problem List:  


(1) Unspecified psychosis


ICD Codes:  F29 - Unspecified psychosis not due to a substance or known 

physiological condition


Assessment & Plan:  On psychiatric evaluation today the patient does not 

present any neuropsychiatric symptoms that required immediate psychiatric 

intervention.  The patient denies depression, denies anxiety, denies eric and 

psychosis.  She denies suicidal and homicidal ideation, she denies visual and 

auditory hallucinations.  Patient is logical, coherent and relevant.  Oriented 

3, no attention deficit, no fluctuation of consciousness, no gross cognitive 

impairment.  The patient has history of bipolar disorder, brief psychotic 

disorder, mild dementia, with some psychiatric hospitalizations, no previous 

suicide attempt the patient is engaged in outpatient psychiatric care with Dr. Curiel.  She does not meet criteria for involuntary psychiatric admission at this 

moment.  Her recent episode of altered mental status could be related with 

sudden withdrawal of benzodiazepine, as already happened before to the patient.

  But, could be also related with an underlying medical condition.  Continue 

Risperdal 1 mg twice daily, Aricept 10 mg, Xanax 1 mg twice daily, but 

discontinue amitriptyline 100 mg due to his strong anticholinergic effects, 

including cognitive distortion, especially in patient with dementia.  

Psychiatric conditions to be managed by outpatient psychiatry.  Consult 

appreciated.





Assessment & Plan


Estimated LOS:  Kali Rosenberg MD Apr 10, 2018 12:01

## 2018-04-10 NOTE — HHI.PR
Subjective


Remarks


Pt without any new complaints


Denies any fevers or chills


Slight cough during the night


No dysuria or urinary frequency





Objective


Vitals





Vital Signs








  Date Time  Temp Pulse Resp B/P (MAP) Pulse Ox O2 Delivery O2 Flow Rate FiO2


 


4/10/18 12:00 97.9 83 20 118/59 (78) 97   


 


4/10/18 12:00  86      


 


4/10/18 08:00 98.3 83 20 118/58 (78) 100   


 


4/10/18 08:00  81      


 


4/10/18 07:00      Room Air  


 


4/10/18 04:42 99.0 88 16 102/52 (69) 100   


 


4/10/18 03:45  86      


 


4/10/18 00:41 98.7 90 16 118/56 (76) 99   


 


4/9/18 23:46  88      


 


4/9/18 21:34 98.2 82 16 115/64 (81) 100   


 


4/9/18 20:45      Room Air  


 


4/9/18 19:02 98.0 94 20 124/60 (81) 98   


 


4/9/18 18:31        


 


4/9/18 16:02  97 20 121/75 (90) 100 Room Air  














 4/10/18 4/10/18 4/11/18





 15:00 23:00 07:00


 


   


 


# Voids 4  


 


# Bowel Movements 1  








Result Diagram:  


4/10/18 0605                                                                   

             4/10/18 0605





Other Results





 Laboratory Tests








Test


  4/9/18


11:22 4/9/18


12:00 4/9/18


12:55 4/9/18


13:35


 


Blood Urea Nitrogen 8 MG/DL    


 


Creatinine 1.19 MG/DL    


 


Random Glucose 190 MG/DL    


 


Total Protein 8.5 GM/DL    


 


Albumin 3.9 GM/DL    


 


Calcium Level 9.1 MG/DL    


 


Alkaline Phosphatase 122 U/L    


 


Aspartate Amino Transf


(AST/SGOT) 30 U/L 


  


  


  


 


 


Alanine Aminotransferase


(ALT/SGPT) 18 U/L 


  


  


  


 


 


Total Bilirubin 0.7 MG/DL    


 


Sodium Level 137 MEQ/L    


 


Potassium Level 4.0 MEQ/L    


 


Chloride Level 102 MEQ/L    


 


Carbon Dioxide Level 17.8 MEQ/L    


 


Anion Gap 17 MEQ/L    


 


Estimat Glomerular Filtration


Rate 55 ML/MIN 


  


  


  


 


 


Lactic Acid Level 7.8 mmol/L    2.5 mmol/L 


 


Ammonia 75 MCMOL/L    


 


Total Creatine Kinase 478 U/L    


 


Creatine Kinase MB 10.8 NG/ML    


 


Creatine Kinase MB % 2.3 %    


 


Thyroid Stimulating Hormone


3rd Gen 0.565 uIU/ML 


  


  


  


 


 


Ethyl Alcohol Level


  LESS THAN 3


MG/DL 


  


  


 


 


Urine Color  LIGHT-YELLOW   


 


Urine Turbidity  CLEAR   


 


Urine pH  7.0   


 


Urine Specific Gravity  1.011   


 


Urine Protein  TRACE mg/dL   


 


Urine Glucose (UA)  70 mg/dL   


 


Urine Ketones  40 mg/dL   


 


Urine Occult Blood  NEG   


 


Urine Nitrite  NEG   


 


Urine Bilirubin  NEG   


 


Urine Urobilinogen


  


  LESS THAN 2.0


MG/DL 


  


 


 


Urine Leukocyte Esterase  NEG   


 


Urine RBC  2 /hpf   


 


Urine WBC  1 /hpf   


 


Microscopic Urinalysis Comment


  


  CATH-CULT NOT


IND 


  


 


 


Urine Opiates Screen  NEG   


 


Urine Barbiturates Screen  NEG   


 


Urine Amphetamines Screen  NEG   


 


Urine Benzodiazepines Screen  POS   


 


Urine Cocaine Screen  NEG   


 


Urine Cannabinoids Screen  NEG   


 


White Blood Count   8.6 TH/MM3  


 


Red Blood Count   4.64 MIL/MM3  


 


Hemoglobin   12.5 GM/DL  


 


Hematocrit   38.6 %  


 


Mean Corpuscular Volume   83.2 FL  


 


Mean Corpuscular Hemoglobin   27.0 PG  


 


Mean Corpuscular Hemoglobin


Concent 


  


  32.4 % 


  


 


 


Red Cell Distribution Width   14.4 %  


 


Platelet Count   328 TH/MM3  


 


Mean Platelet Volume   7.6 FL  


 


Neutrophils (%) (Auto)   80.4 %  


 


Lymphocytes (%) (Auto)   13.2 %  


 


Monocytes (%) (Auto)   5.8 %  


 


Eosinophils (%) (Auto)   0.1 %  


 


Basophils (%) (Auto)   0.5 %  


 


Neutrophils # (Auto)   6.9 TH/MM3  


 


Lymphocytes # (Auto)   1.1 TH/MM3  


 


Monocytes # (Auto)   0.5 TH/MM3  


 


Eosinophils # (Auto)   0.0 TH/MM3  


 


Basophils # (Auto)   0.0 TH/MM3  


 


CBC Comment   DIFF FINAL  


 


Differential Comment     


 


Test


  4/9/18


14:49 4/9/18


18:05 4/9/18


21:20 4/10/18


06:05


 


Magnesium Level 1.9 MG/DL    


 


Total Bilirubin 0.5 MG/DL    


 


Direct Bilirubin 0.2 MG/DL    


 


Indirect Bilirubin 0.3 MG/DL    


 


Aspartate Amino Transf


(AST/SGOT) 26 U/L 


  


  


  


 


 


Alanine Aminotransferase


(ALT/SGPT) 15 U/L 


  


  


  


 


 


Alkaline Phosphatase 107 U/L    


 


Total Protein 7.5 GM/DL    


 


Albumin 3.3 GM/DL    


 


Vitamin B12 Level 416 PG/ML    


 


Rapid Plasma Reagin Titer 1:2    


 


Rapid Plasma Reagin REACTIVE    


 


Lactic Acid Level  2.8 mmol/L  3.3 mmol/L  


 


White Blood Count    7.2 TH/MM3 


 


Red Blood Count    4.31 MIL/MM3 


 


Hemoglobin    11.7 GM/DL 


 


Hematocrit    36.1 % 


 


Mean Corpuscular Volume    83.7 FL 


 


Mean Corpuscular Hemoglobin    27.2 PG 


 


Mean Corpuscular Hemoglobin


Concent 


  


  


  32.5 % 


 


 


Red Cell Distribution Width    14.4 % 


 


Platelet Count    314 TH/MM3 


 


Mean Platelet Volume    7.8 FL 


 


Neutrophils (%) (Auto)    49.9 % 


 


Lymphocytes (%) (Auto)    39.8 % 


 


Monocytes (%) (Auto)    7.9 % 


 


Eosinophils (%) (Auto)    2.0 % 


 


Basophils (%) (Auto)    0.4 % 


 


Neutrophils # (Auto)    3.6 TH/MM3 


 


Lymphocytes # (Auto)    2.9 TH/MM3 


 


Monocytes # (Auto)    0.6 TH/MM3 


 


Eosinophils # (Auto)    0.1 TH/MM3 


 


Basophils # (Auto)    0.0 TH/MM3 


 


CBC Comment    DIFF FINAL 


 


Differential Comment     


 


Blood Urea Nitrogen    11 MG/DL 


 


Creatinine    0.99 MG/DL 


 


Random Glucose    152 MG/DL 


 


Calcium Level    8.1 MG/DL 


 


Sodium Level    142 MEQ/L 


 


Potassium Level    3.4 MEQ/L 


 


Chloride Level    111 MEQ/L 


 


Carbon Dioxide Level    23.4 MEQ/L 


 


Anion Gap    8 MEQ/L 


 


Estimat Glomerular Filtration


Rate 


  


  


  68 ML/MIN 


 


 


Test


  4/10/18


10:41 


  


  


 


 


Lactic Acid Level 3.7 mmol/L    








Imaging





Last Impressions








Head CT 4/9/18 1104 Signed





Impressions: 





 Service Date/Time:  Monday, April 9, 2018 12:21 - CONCLUSION: Negative for an 





 acute process    Kulwant Ramachandran MD  FACR


 


Chest X-Ray 4/9/18 1104 Signed





Impressions: 





 Service Date/Time:  Monday, April 9, 2018 12:50 - CONCLUSION:  1. Platelike 





 airspace disease with associated mild volume loss in the left lower lobe most 





 consistent with atelectasis.     Jae Quintero MD 








Objective Remarks


General: NAD, Awake and alert


Chest: CTA


Cardiac: Regular


Abd: +BS, soft ND/NT


Ext: No edema





A/P


Problem List:  


(1) Altered mental status


ICD Codes:  R41.82 - Altered mental status, unspecified


Status:  Acute


Plan:  


- Patient is a pleasant 62 y/o female who was brought to the ER by her family 

due to altered mental status.


- Per ER reported that the patient was lethargic upon arrival. Per family 

recent medication dose change by psychiatry, Risperdal


- There is no information in the patient outpatient records 


- Patient alert and oriented, but not good historian as to her past medical 

history


- Patient does not appear septic


- IV Ativan prn agitation


- Appreciate Psychiatry consultation


- They felt that the pts AMS could have been related to sudden withdrawal of 

benzodiazepine vs. an underlying medical condition vs. other


- Psychiatry recommended continuing Risperdal 1 mg twice daily, Aricept 10 mg, 

Xanax 1 mg twice daily, but discontinuing Amitriptyline 


 due to his strong anticholinergic effects, including cognitive distortion, 

especially in patient with dementia. 


- Her lactic acid was 7.8 at admission and trended down to 2.5 a few hours 

following admission but this has started to trend back up. Most


 recent lactic acid is 3.7


- Blood cultures with NGTD


- Pt has been afebrile


- It would be helpful to have availability of patient's outpatient records.


- I will discuss case with [facility] case management, and see if outpatient records 

could be obtained from current/previous providers


And scanned into the [facility] EHR for future reference


- Repeat lactic acid in AM


- Pts RPR was noted to be positive with a titer of 1:2


- Significance of this is unclear, we will consult ID for further 

recommendations. 


- DVT prophylaxis


- Supportive care


- Anticipate d/c to home in 2-3 days





(2) Bipolar disorder


ICD Codes:  F31.9 - Bipolar disorder, unspecified


Status:  Chronic


Plan:  


- Appreciate input from Psychiatry





(3) HTN (hypertension)


ICD Codes:  I10 - Essential (primary) hypertension


Status:  Chronic


Plan:  


- Continue Coreg 3.125mg BID


- Monitor as pts BP Is running low/normal





(4) DM2 (diabetes mellitus, type 2)


ICD Codes:  E11.9 - Type 2 diabetes mellitus without complications


Status:  Chronic


Plan:  


- Hold metformin


- [facility]





Assessment and Plan


Patient examined.


Assessment and plan formulated with Maria M Leos PA-C.


I agree with the above.





Problem Qualifiers





(1) Altered mental status:  


Qualified Codes:  R41.82 - Altered mental status, unspecified


(2) Bipolar disorder:  





(3) HTN (hypertension):  


Qualified Codes:  I10 - Essential (primary) hypertension


(4) DM2 (diabetes mellitus, type 2):  


Qualified Codes:  E11.8 - Type 2 diabetes mellitus with unspecified 

complications








Maria M Leos Apr 10, 2018 15:27


Jovi Coates DO Apr 13, 2018 23:16

## 2018-04-10 NOTE — RADRPT
EXAM DATE/TIME:  04/10/2018 10:03 

 

[facility] COMPARISON:     

CHEST SINGLE AP, April 09, 2018, 12:50.

 

                     

INDICATIONS :     

Coughing, short of breath

                     

 

MEDICAL HISTORY :            

AMS   

 

SURGICAL HISTORY :     

None.   

 

ENCOUNTER:     

Initial                                        

 

ACUITY:     

2 days      

 

PAIN SCORE:     

0/10

 

LOCATION:     

Bilateral chest 

 

FINDINGS:     

Frontal and lateral views of the chest demonstrate a normal-sized cardiac silhouette. Lungs are under
inflated. There is linear opacity at the left lung base. No pleural effusion, consolidation, or pneum
othorax is identified. The bones and soft tissues demonstrate no acute finding. There are degenerativ
e changes of the thoracic spine.

 

CONCLUSION:     

Stable chest x-ray with atelectasis versus scar at the left lung base. Otherwise, no acute cardiopulm
onary abnormality is identified.

 

 

 

 Stephon Reynolds MD on April 10, 2018 at 10:56           

Board Certified Radiologist.

 This report was verified electronically.

## 2018-04-11 ENCOUNTER — HOSPITAL ENCOUNTER (INPATIENT)
Dept: HOSPITAL 17 - H250 | Age: 63
LOS: 1 days | Discharge: HOME | DRG: 882 | End: 2018-04-12
Attending: PSYCHIATRY & NEUROLOGY | Admitting: PSYCHIATRY & NEUROLOGY
Payer: MEDICARE

## 2018-04-11 VITALS
OXYGEN SATURATION: 99 % | HEART RATE: 100 BPM | DIASTOLIC BLOOD PRESSURE: 94 MMHG | TEMPERATURE: 97.8 F | SYSTOLIC BLOOD PRESSURE: 167 MMHG | RESPIRATION RATE: 18 BRPM

## 2018-04-11 VITALS
SYSTOLIC BLOOD PRESSURE: 182 MMHG | DIASTOLIC BLOOD PRESSURE: 100 MMHG | RESPIRATION RATE: 18 BRPM | HEART RATE: 92 BPM | TEMPERATURE: 98.3 F | OXYGEN SATURATION: 97 %

## 2018-04-11 VITALS — HEART RATE: 92 BPM

## 2018-04-11 VITALS — WEIGHT: 144.84 LBS | BODY MASS INDEX: 24.73 KG/M2 | HEIGHT: 64 IN

## 2018-04-11 VITALS
OXYGEN SATURATION: 100 % | SYSTOLIC BLOOD PRESSURE: 150 MMHG | TEMPERATURE: 98.4 F | RESPIRATION RATE: 18 BRPM | HEART RATE: 97 BPM | DIASTOLIC BLOOD PRESSURE: 95 MMHG

## 2018-04-11 VITALS
RESPIRATION RATE: 18 BRPM | HEART RATE: 101 BPM | DIASTOLIC BLOOD PRESSURE: 67 MMHG | SYSTOLIC BLOOD PRESSURE: 136 MMHG | OXYGEN SATURATION: 98 % | TEMPERATURE: 99.5 F

## 2018-04-11 VITALS
TEMPERATURE: 99.1 F | OXYGEN SATURATION: 98 % | DIASTOLIC BLOOD PRESSURE: 98 MMHG | HEART RATE: 100 BPM | SYSTOLIC BLOOD PRESSURE: 178 MMHG | RESPIRATION RATE: 16 BRPM

## 2018-04-11 VITALS — HEART RATE: 103 BPM

## 2018-04-11 DIAGNOSIS — E11.9: ICD-10-CM

## 2018-04-11 DIAGNOSIS — Z79.84: ICD-10-CM

## 2018-04-11 DIAGNOSIS — F48.1: Primary | ICD-10-CM

## 2018-04-11 DIAGNOSIS — I10: ICD-10-CM

## 2018-04-11 DIAGNOSIS — F03.90: ICD-10-CM

## 2018-04-11 LAB
ALBUMIN SERPL-MCNC: 3.5 GM/DL (ref 3.4–5)
ALP SERPL-CCNC: 104 U/L (ref 45–117)
ALT SERPL-CCNC: 21 U/L (ref 10–53)
AST SERPL-CCNC: 28 U/L (ref 15–37)
BASOPHILS # BLD AUTO: 0.1 TH/MM3 (ref 0–0.2)
BASOPHILS NFR BLD: 1.3 % (ref 0–2)
BILIRUB SERPL-MCNC: 0.5 MG/DL (ref 0.2–1)
BUN SERPL-MCNC: 5 MG/DL (ref 7–18)
CALCIUM SERPL-MCNC: 8.7 MG/DL (ref 8.5–10.1)
CHLORIDE SERPL-SCNC: 104 MEQ/L (ref 98–107)
CREAT SERPL-MCNC: 0.74 MG/DL (ref 0.5–1)
EOSINOPHIL # BLD: 0.1 TH/MM3 (ref 0–0.4)
EOSINOPHIL NFR BLD: 1.8 % (ref 0–4)
ERYTHROCYTE [DISTWIDTH] IN BLOOD BY AUTOMATED COUNT: 14.3 % (ref 11.6–17.2)
FOLATE SERPL-MCNC: (no result) NG/ML (ref 3.1–17.5)
GFR SERPLBLD BASED ON 1.73 SQ M-ARVRAT: 95 ML/MIN (ref 89–?)
GLUCOSE SERPL-MCNC: 135 MG/DL (ref 74–106)
HCO3 BLD-SCNC: 25.2 MEQ/L (ref 21–32)
HCT VFR BLD CALC: 39 % (ref 35–46)
HGB BLD-MCNC: 12.8 GM/DL (ref 11.6–15.3)
LYMPHOCYTES # BLD AUTO: 1.8 TH/MM3 (ref 1–4.8)
LYMPHOCYTES NFR BLD AUTO: 31.1 % (ref 9–44)
MCH RBC QN AUTO: 27.4 PG (ref 27–34)
MCHC RBC AUTO-ENTMCNC: 32.7 % (ref 32–36)
MCV RBC AUTO: 83.8 FL (ref 80–100)
MONOCYTE #: 0.4 TH/MM3 (ref 0–0.9)
MONOCYTES NFR BLD: 6.7 % (ref 0–8)
NEUTROPHILS # BLD AUTO: 3.4 TH/MM3 (ref 1.8–7.7)
NEUTROPHILS NFR BLD AUTO: 59.1 % (ref 16–70)
PLATELET # BLD: 319 TH/MM3 (ref 150–450)
PMV BLD AUTO: 8 FL (ref 7–11)
PROT SERPL-MCNC: 7.9 GM/DL (ref 6.4–8.2)
RBC # BLD AUTO: 4.66 MIL/MM3 (ref 4–5.3)
SODIUM SERPL-SCNC: 139 MEQ/L (ref 136–145)
VIT B12 SERPL-MCNC: 455 PG/ML (ref 193–986)
WBC # BLD AUTO: 5.8 TH/MM3 (ref 4–11)

## 2018-04-11 PROCEDURE — 80048 BASIC METABOLIC PNL TOTAL CA: CPT

## 2018-04-11 PROCEDURE — 82948 REAGENT STRIP/BLOOD GLUCOSE: CPT

## 2018-04-11 PROCEDURE — 83036 HEMOGLOBIN GLYCOSYLATED A1C: CPT

## 2018-04-11 PROCEDURE — 80061 LIPID PANEL: CPT

## 2018-04-11 RX ADMIN — CARVEDILOL SCH MG: 3.12 TABLET, FILM COATED ORAL at 05:23

## 2018-04-11 RX ADMIN — INSULIN ASPART SCH: 100 INJECTION, SOLUTION INTRAVENOUS; SUBCUTANEOUS at 08:00

## 2018-04-11 RX ADMIN — INSULIN ASPART SCH: 100 INJECTION, SOLUTION INTRAVENOUS; SUBCUTANEOUS at 12:00

## 2018-04-11 RX ADMIN — Medication SCH ML: at 09:00

## 2018-04-11 RX ADMIN — CARVEDILOL SCH MG: 6.25 TABLET, FILM COATED ORAL at 20:22

## 2018-04-11 RX ADMIN — INSULIN ASPART SCH: 100 INJECTION, SOLUTION INTRAVENOUS; SUBCUTANEOUS at 20:22

## 2018-04-11 NOTE — HHI.PR
Subjective


Remarks


Patient evaluated with daughter at bedside


Patient delirious unable to having meaningful conversation unable to follow 

commands





Objective


Vitals





Vital Signs








  Date Time  Temp Pulse Resp B/P (MAP) Pulse Ox O2 Delivery O2 Flow Rate FiO2


 


4/11/18 08:00 98.3 92 18 182/100 (127) 97   


 


4/11/18 04:12 99.1 100 16 178/98 (124) 98   


 


4/11/18 04:03  103      


 


4/11/18 00:05  92      


 


4/10/18 23:45 98.6 97 16 169/97 (121) 100   


 


4/10/18 20:52 98.8 101 16 169/83 (111) 100   


 


4/10/18 20:30      Room Air  


 


4/10/18 20:26  93      


 


4/10/18 16:00 98.5 85 20 134/65 (88) 99   


 


4/10/18 12:00 97.9 83 20 118/59 (78) 97   


 


4/10/18 12:00  86      








Result Diagram:  


4/10/18 0605                                                                   

             4/10/18 0605





Other Results





 Laboratory Tests








Test


  4/9/18


11:22 4/9/18


12:00 4/9/18


12:55 4/9/18


13:35


 


Blood Urea Nitrogen 8 MG/DL    


 


Creatinine 1.19 MG/DL    


 


Random Glucose 190 MG/DL    


 


Total Protein 8.5 GM/DL    


 


Albumin 3.9 GM/DL    


 


Calcium Level 9.1 MG/DL    


 


Alkaline Phosphatase 122 U/L    


 


Aspartate Amino Transf


(AST/SGOT) 30 U/L 


  


  


  


 


 


Alanine Aminotransferase


(ALT/SGPT) 18 U/L 


  


  


  


 


 


Total Bilirubin 0.7 MG/DL    


 


Sodium Level 137 MEQ/L    


 


Potassium Level 4.0 MEQ/L    


 


Chloride Level 102 MEQ/L    


 


Carbon Dioxide Level 17.8 MEQ/L    


 


Anion Gap 17 MEQ/L    


 


Estimat Glomerular Filtration


Rate 55 ML/MIN 


  


  


  


 


 


Lactic Acid Level 7.8 mmol/L    2.5 mmol/L 


 


Ammonia 75 MCMOL/L    


 


Total Creatine Kinase 478 U/L    


 


Creatine Kinase MB 10.8 NG/ML    


 


Creatine Kinase MB % 2.3 %    


 


Thyroid Stimulating Hormone


3rd Gen 0.565 uIU/ML 


  


  


  


 


 


Ethyl Alcohol Level


  LESS THAN 3


MG/DL 


  


  


 


 


Urine Color  LIGHT-YELLOW   


 


Urine Turbidity  CLEAR   


 


Urine pH  7.0   


 


Urine Specific Gravity  1.011   


 


Urine Protein  TRACE mg/dL   


 


Urine Glucose (UA)  70 mg/dL   


 


Urine Ketones  40 mg/dL   


 


Urine Occult Blood  NEG   


 


Urine Nitrite  NEG   


 


Urine Bilirubin  NEG   


 


Urine Urobilinogen


  


  LESS THAN 2.0


MG/DL 


  


 


 


Urine Leukocyte Esterase  NEG   


 


Urine RBC  2 /hpf   


 


Urine WBC  1 /hpf   


 


Microscopic Urinalysis Comment


  


  CATH-CULT NOT


IND 


  


 


 


Urine Opiates Screen  NEG   


 


Urine Barbiturates Screen  NEG   


 


Urine Amphetamines Screen  NEG   


 


Urine Benzodiazepines Screen  POS   


 


Urine Cocaine Screen  NEG   


 


Urine Cannabinoids Screen  NEG   


 


White Blood Count   8.6 TH/MM3  


 


Red Blood Count   4.64 MIL/MM3  


 


Hemoglobin   12.5 GM/DL  


 


Hematocrit   38.6 %  


 


Mean Corpuscular Volume   83.2 FL  


 


Mean Corpuscular Hemoglobin   27.0 PG  


 


Mean Corpuscular Hemoglobin


Concent 


  


  32.4 % 


  


 


 


Red Cell Distribution Width   14.4 %  


 


Platelet Count   328 TH/MM3  


 


Mean Platelet Volume   7.6 FL  


 


Neutrophils (%) (Auto)   80.4 %  


 


Lymphocytes (%) (Auto)   13.2 %  


 


Monocytes (%) (Auto)   5.8 %  


 


Eosinophils (%) (Auto)   0.1 %  


 


Basophils (%) (Auto)   0.5 %  


 


Neutrophils # (Auto)   6.9 TH/MM3  


 


Lymphocytes # (Auto)   1.1 TH/MM3  


 


Monocytes # (Auto)   0.5 TH/MM3  


 


Eosinophils # (Auto)   0.0 TH/MM3  


 


Basophils # (Auto)   0.0 TH/MM3  


 


CBC Comment   DIFF FINAL  


 


Differential Comment     


 


Test


  4/9/18


14:49 4/9/18


18:05 4/9/18


21:20 4/10/18


06:05


 


Magnesium Level 1.9 MG/DL    


 


Total Bilirubin 0.5 MG/DL    


 


Direct Bilirubin 0.2 MG/DL    


 


Indirect Bilirubin 0.3 MG/DL    


 


Aspartate Amino Transf


(AST/SGOT) 26 U/L 


  


  


  


 


 


Alanine Aminotransferase


(ALT/SGPT) 15 U/L 


  


  


  


 


 


Alkaline Phosphatase 107 U/L    


 


Total Protein 7.5 GM/DL    


 


Albumin 3.3 GM/DL    


 


Vitamin B12 Level 416 PG/ML    


 


Rapid Plasma Reagin Titer 1:2    


 


Rapid Plasma Reagin REACTIVE    


 


Lactic Acid Level  2.8 mmol/L  3.3 mmol/L  


 


White Blood Count    7.2 TH/MM3 


 


Red Blood Count    4.31 MIL/MM3 


 


Hemoglobin    11.7 GM/DL 


 


Hematocrit    36.1 % 


 


Mean Corpuscular Volume    83.7 FL 


 


Mean Corpuscular Hemoglobin    27.2 PG 


 


Mean Corpuscular Hemoglobin


Concent 


  


  


  32.5 % 


 


 


Red Cell Distribution Width    14.4 % 


 


Platelet Count    314 TH/MM3 


 


Mean Platelet Volume    7.8 FL 


 


Neutrophils (%) (Auto)    49.9 % 


 


Lymphocytes (%) (Auto)    39.8 % 


 


Monocytes (%) (Auto)    7.9 % 


 


Eosinophils (%) (Auto)    2.0 % 


 


Basophils (%) (Auto)    0.4 % 


 


Neutrophils # (Auto)    3.6 TH/MM3 


 


Lymphocytes # (Auto)    2.9 TH/MM3 


 


Monocytes # (Auto)    0.6 TH/MM3 


 


Eosinophils # (Auto)    0.1 TH/MM3 


 


Basophils # (Auto)    0.0 TH/MM3 


 


CBC Comment    DIFF FINAL 


 


Differential Comment     


 


Blood Urea Nitrogen    11 MG/DL 


 


Creatinine    0.99 MG/DL 


 


Random Glucose    152 MG/DL 


 


Calcium Level    8.1 MG/DL 


 


Sodium Level    142 MEQ/L 


 


Potassium Level    3.4 MEQ/L 


 


Chloride Level    111 MEQ/L 


 


Carbon Dioxide Level    23.4 MEQ/L 


 


Anion Gap    8 MEQ/L 


 


Estimat Glomerular Filtration


Rate 


  


  


  68 ML/MIN 


 


 


Test


  4/10/18


10:41 4/11/18


06:40 


  


 


 


Lactic Acid Level 3.7 mmol/L  0.7 mmol/L   








Imaging





Last Impressions








Head CT 4/9/18 1104 Signed





Impressions: 





 Service Date/Time:  Monday, April 9, 2018 12:21 - CONCLUSION: Negative for an 





 acute process    Kulwant Ramachandran MD  FACR


 


Chest X-Ray 4/9/18 1104 Signed





Impressions: 





 Service Date/Time:  Monday, April 9, 2018 12:50 - CONCLUSION:  1. Platelike 





 airspace disease with associated mild volume loss in the left lower lobe most 





 consistent with atelectasis.     Jae Quintero MD 








Objective Remarks


General: disoriented x3, unable to have conversation or follow commands


Chest: CTA


Cardiac: Regular


Abd: +BS, soft ND/NT


Ext: No edema





A/P


Problem List:  


(1) Altered mental status


ICD Codes:  R41.82 - Altered mental status, unspecified


Status:  Acute


Plan:  


- Patient is a pleasant 64 y/o female who was brought to the ER by her family 

due to altered mental status.


- Per ER reported that the patient was lethargic upon arrival. Per family 

recent medication dose change by psychiatry, Risperdal


- There is no information in the patient outpatient records 


- Patient alert and oriented, but not good historian as to her past medical 

history


- Patient does not appear septic


- IV Ativan prn agitation


- Appreciate Psychiatry consultation


- They felt that the pts AMS could have been related to sudden withdrawal of 

benzodiazepine vs. an underlying medical condition vs. other


- Psychiatry recommended continuing Risperdal 1 mg twice daily, Aricept 10 mg, 

Xanax 1 mg twice daily, but discontinuing Amitriptyline 


 due to his strong anticholinergic effects, including cognitive distortion, 

especially in patient with dementia. 


- Her lactic acid was 7.8 at admission and trended down to 2.5 a few hours 

following admission but this has started to trend back up. Most


 recent lactic acid is 3.7


- Blood cultures with NGTD


- Pt has been afebrile


- It would be helpful to have availability of patient's outpatient records.


- I will discuss case with [facility] case management, and see if outpatient records 

could be obtained from current/previous providers


And scanned into the [facility] EHR for future reference


- Repeat lactic acid in AM


- Pts RPR was noted to be positive with a titer of 1:2


- consult ID recommends: titer of syphilis should be monitored with RPR 

testing. Repeat the test in 6 weeks to see if the titer becomes elevated.  


- DVT prophylaxis


- Supportive care





4/11 Patient received Risperidone last night 2100.  This AM patient noted to be 

acutely delusional.  Patient also evaluated by Dr. Vela who has placed 

patient under backer act.  Plan to complete EEG then transfer patient to 

inpatient psych for close monitoring and medication adjustment


- EEG requested





(2) Bipolar disorder


ICD Codes:  F31.9 - Bipolar disorder, unspecified


Status:  Chronic


Plan:  


- Appreciate input from Psychiatry





(3) HTN (hypertension)


ICD Codes:  I10 - Essential (primary) hypertension


Status:  Chronic


Plan:  - patient BP noted to be elevated patient also agitated, elevated BP 

possibly related to agitation


- Increase Coreg to 6.235 mg BID


- add Clonidine 0.1 mg PO Q6H as needed for HTN








(4) DM2 (diabetes mellitus, type 2)


ICD Codes:  E11.9 - Type 2 diabetes mellitus without complications


Status:  Chronic


Plan:  


- Hold metformin


- [facility]





Assessment and Plan


Patient examined.


Assessment and plan formulated with Nika Givens PA-C.


I agree with the above.





Problem Qualifiers





(1) Altered mental status:  


Qualified Codes:  R41.82 - Altered mental status, unspecified


(2) Bipolar disorder:  





(3) HTN (hypertension):  


Qualified Codes:  I10 - Essential (primary) hypertension


(4) DM2 (diabetes mellitus, type 2):  


Qualified Codes:  E11.8 - Type 2 diabetes mellitus with unspecified 

complications








Nika Givens Apr 11, 2018 11:21


Jovi Coates DO Apr 13, 2018 23:17

## 2018-04-11 NOTE — HHI.PYPN
Subjective


Remarks


Patient seen for reevaluation today, patient is accompanied by her amitahier. 

Different than yesterday to day the patient is very agitated, no following 

verbal commands, disorganized, unable to focus, unable to sustain a conversation

, internally preoccupied and talking with unexisting people in the room. Her 

daughter manifest this is the first time she sees her mother like this and "

this all started after changes indications recently by Dr. Curiel". she believes 

current presentation is related with newly prescribed Risperdal, but at he same 

time she is unable to confirm if the patient's benzos were discontinued.





Review of Systems


Psychiatric:  COMPLAINS OF: Hallucinations, Delusions





Mental Status Examination


Appearance:  Appropriate


Consciousness:  Alert


Orientation:  Person


Motor Activity:  Normal gait


Speech:  Unremarkable


Language:  Perseveration


Fund of Knowledge:  Inadequate


Attention and Concentration:  Adequate


Memory:  Impaired


Mood:  Irritable


Affect:  Irritable


Thought Process & Associations:  Loose associations, Disorganized


Thought Content:  Appropriate


Hallucination Type:  None, Visual


Suicidal Ideation:  No


Suicidal Plan:  No


Suicidal Intention:  No


Homicidal Ideation:  No


Homicidal Plan:  No


Homicidal Intention:  No


Insight:  Adequate


Judgment:  Adequate





Results


Labs











Test


  4/11/18


06:40


 


White Blood Count 5.8 TH/MM3 


 


Red Blood Count 4.66 MIL/MM3 


 


Hemoglobin 12.8 GM/DL 


 


Hematocrit 39.0 % 


 


Mean Corpuscular Volume 83.8 FL 


 


Mean Corpuscular Hemoglobin 27.4 PG 


 


Mean Corpuscular Hemoglobin


Concent 32.7 % 


 


 


Red Cell Distribution Width 14.3 % 


 


Platelet Count 319 TH/MM3 


 


Mean Platelet Volume 8.0 FL 


 


Neutrophils (%) (Auto) 59.1 % 


 


Lymphocytes (%) (Auto) 31.1 % 


 


Monocytes (%) (Auto) 6.7 % 


 


Eosinophils (%) (Auto) 1.8 % 


 


Basophils (%) (Auto) 1.3 % 


 


Neutrophils # (Auto) 3.4 TH/MM3 


 


Lymphocytes # (Auto) 1.8 TH/MM3 


 


Monocytes # (Auto) 0.4 TH/MM3 


 


Eosinophils # (Auto) 0.1 TH/MM3 


 


Basophils # (Auto) 0.1 TH/MM3 


 


CBC Comment DIFF FINAL 


 


Differential Comment  


 


Lactic Acid Level 0.7 mmol/L 














 Date/Time


Source Procedure


Growth Status


 


 


 4/9/18 11:22


Blood Peripheral Aerobic Blood Culture - Preliminary


NO GROWTH IN 2 DAYS Resulted


 


 4/9/18 11:22


Blood Peripheral Anaerobic Blood Culture - Preliminary


NO GROWTH IN 2 DAYS Resulted








Vitals/IOs





Vital Signs








  Date Time  Temp Pulse Resp B/P (MAP) Pulse Ox O2 Delivery O2 Flow Rate FiO2


 


4/11/18 08:00 98.3 92 18 182/100 (127) 97   


 


4/10/18 20:30      Room Air  














Intake and Output   


 


 4/11/18 4/11/18 4/12/18





 08:00 16:00 00:00


 


Intake Total 240 ml  


 


Balance 240 ml  











Assessment & Plan


Problem List:  


(1) Unspecified psychosis


ICD Codes:  F29 - Unspecified psychosis not due to a substance or known 

physiological condition


Assessment & Plan:  Patient today is found to be hyperactive, with marked 

psychomotor agitation, disorganized behavior and speech, internally stimulation 

and having active visual hallucinations.  Will start CIWA protocol since 

sedative-hypnotic withdrawal cold account for current psychotic symptoms. 

discontinue Risperdal 1 mg bid and start Seroquel 25 bid. order Haldol 2 mg im q

/6h PRN severe agitation. QTc is 423.


order EEG to rule an non convulsive epileptogenic focus.  


Patient was [facility] acted to be admitted in psychiatry if EEG is negative to 

treat persistent psychosis and agitation. 





Assessment & Plan


Estimated LOS:  days


Justification for Cont. Inpt.


potential admission in psychiatry











Kali Vela MD Apr 11, 2018 11:53

## 2018-04-11 NOTE — HHI.DS
Discharge Summary


Admission Date


Apr 9, 2018 at 13:29


Discharge Date:  Apr 11, 2018


Admitting Diagnosis





SIRS, lactic acidosis, hyperammonemia





(1) Altered mental status


Diagnosis:  Principal


ICD Codes:  R41.82 - Altered mental status, unspecified


Status:  Acute


(2) Bipolar disorder


Diagnosis:  Principal


ICD Codes:  F31.9 - Bipolar disorder, unspecified


Status:  Chronic


(3) HTN (hypertension)


Diagnosis:  Secondary


ICD Codes:  I10 - Essential (primary) hypertension


Status:  Chronic


(4) DM2 (diabetes mellitus, type 2)


Diagnosis:  Secondary


ICD Codes:  E11.9 - Type 2 diabetes mellitus without complications


Status:  Chronic


Consultants


Dr. Vela, psychiatry


Dr. Cohen, ID


Procedures


none


Brief History


Patient is a pleasant and cooperative 63-year-old female who was brought to the 

ER by her family due to altered mental status.


The family reports that the patient recently had a change in her medications.  

The patient has a history of bipolar disorder.  Patient sees a private 

psychiatrist, . 


Per family, patient's Risperdal was recently increased, and the patient has 

become lethargic over the last few days to the point where she was rarely 

getting out of bed and difficult to rouse. 





I have reviewed the patient's outpatient records in the [facility] EHR .  

Unfortunately, it appears that the patient is likely new to [facility] and there are 

no records for me to review in the [facility] EHR. 


I have reviewed the patient's previous records at [facility].  Patient was most 

recently admitted under [place] act for psychosis June 2017.  At that time 

patient was attended by psychiatry, Dr. Stephon Dominguez. 





Patient now appears much different from the ER's report of Ms. Carpenter 

clinical status upon arrival.  Patient is able to answer questions appropriately

, but not a wonderful historian.  Patient appears nontoxic, and the physical 

examination is unremarkable.  Specifically, patient does NOT appear septic.  

Patient actually has no acute medical complaints at this time.  I will NOT 

resume patient's reported psychiatric medications at this time, since it 

appears she may have been overmedicated.  I have placed consultation with 

psychiatry.  I await with interest psychiatry consultation.








Patient denies fever or chills.  Patient denies cough, shortness of breath, or 

wheezing.  Patient denies sick contacts or recent travel.


Patient denies complaint of neck stiffness or headache.


CBC/BMP:  


4/11/18 0640                                                                   

             4/11/18 1240





Significant Findings





Laboratory Tests








Test


  4/9/18


11:22 4/9/18


12:00 4/9/18


12:55 4/9/18


13:35


 


Creatinine


  1.19 MG/DL


(0.50-1.00) 


  


  


 


 


Random Glucose


  190 MG/DL


() 


  


  


 


 


Total Protein


  8.5 GM/DL


(6.4-8.2) 


  


  


 


 


Alkaline Phosphatase


  122 U/L


() 


  


  


 


 


Carbon Dioxide Level


  17.8 MEQ/L


(21.0-32.0) 


  


  


 


 


Anion Gap


  17 MEQ/L


(5-15) 


  


  


 


 


Estimat Glomerular Filtration


Rate 55 ML/MIN


(>89) 


  


  


 


 


Lactic Acid Level


  7.8 mmol/L


(0.4-2.0) 


  


  2.5 mmol/L


(0.4-2.0)


 


Ammonia


  75 MCMOL/L


(11-32) 


  


  


 


 


Total Creatine Kinase


  478 U/L


() 


  


  


 


 


Creatine Kinase MB


  10.8 NG/ML


(0.5-3.6) 


  


  


 


 


Urine Glucose (UA)  70 mg/dL (NEG)   


 


Urine Ketones  40 mg/dL (NEG)   


 


Urine Benzodiazepines Screen  POS (NEG)   


 


Neutrophils (%) (Auto)


  


  


  80.4 %


(16.0-70.0) 


 


 


Test


  4/9/18


14:49 4/9/18


18:05 4/9/18


21:20 4/10/18


06:05


 


Albumin


  3.3 GM/DL


(3.4-5.0) 


  


  


 


 


Rapid Plasma Reagin Titer


  1:2


(NON-REACTVE) 


  


  


 


 


Rapid Plasma Reagin


  REACTIVE


(NON-REACTVE) 


  


  


 


 


Lactic Acid Level


  


  2.8 mmol/L


(0.4-2.0) 3.3 mmol/L


(0.4-2.0) 


 


 


Random Glucose


  


  


  


  152 MG/DL


()


 


Calcium Level


  


  


  


  8.1 MG/DL


(8.5-10.1)


 


Potassium Level


  


  


  


  3.4 MEQ/L


(3.5-5.1)


 


Chloride Level


  


  


  


  111 MEQ/L


()


 


Estimat Glomerular Filtration


Rate 


  


  


  68 ML/MIN


(>89)


 


Test


  4/10/18


10:41 4/11/18


06:40 4/11/18


12:40 


 


 


Lactic Acid Level


  3.7 mmol/L


(0.4-2.0) 


  


  


 


 


Blood Urea Nitrogen   5 MG/DL (7-18)  


 


Random Glucose


  


  


  135 MG/DL


() 


 


 


Potassium Level


  


  


  3.3 MEQ/L


(3.5-5.1) 


 


 


Ammonia


  


  


  10 MCMOL/L


(11-32) 


 


 


Folate


  


  


  GREATER THAN


20.0 NG/ML 


 








Imaging





Last Impressions








Chest X-Ray 4/10/18 1010 Signed





Impressions: 





 Service Date/Time:  Tuesday, April 10, 2018 10:03 - CONCLUSION:  Stable chest 





 x-ray with atelectasis versus scar at the left lung base. Otherwise, no acute 





 cardiopulmonary abnormality is identified.     Stephon Reynolds MD 


 


Head CT 4/9/18 1104 Signed





Impressions: 





 Service Date/Time:  Monday, April 9, 2018 12:21 - CONCLUSION: Negative for an 





 acute process    Kulwant Ramachandran MD  FACR








PE at Discharge


General: disoriented x3, unable to have conversation or follow commands


Chest: CTA


Cardiac: Regular


Abd: +BS, soft ND/NT


Ext: No edema


Hospital Course


Altered mental status


- Patient is a pleasant 64 y/o female who was brought to the ER by her family 

due to altered mental status.


- Per ER reported that the patient was lethargic upon arrival. Per family 

recent medication dose change by psychiatry, Risperdal


- There is no information in the patient outpatient records 


- Patient alert and oriented, but not good historian as to her past medical 

history


- Patient does not appear septic


- IV Ativan prn agitation


- Appreciate Psychiatry consultation


- They felt that the pts AMS could have been related to sudden withdrawal of 

benzodiazepine vs. an underlying medical condition vs. other


- Psychiatry recommended continuing Risperdal 1 mg twice daily, Aricept 10 mg, 

Xanax 1 mg twice daily, but discontinuing Amitriptyline 


 due to his strong anticholinergic effects, including cognitive distortion, 

especially in patient with dementia. 


- Her lactic acid was 7.8 at admission and trended down to 2.5 a few hours 

following admission but this has started to trend back up. Most


 recent lactic acid is 3.7


- Blood cultures with NGTD


- Pt has been afebrile


- It would be helpful to have availability of patient's outpatient records.


- I will discuss case with [facility] case management, and see if outpatient records 

could be obtained from current/previous providers


And scanned into the [facility] EHR for future reference


- Repeat lactic acid in AM


- Pts RPR was noted to be positive with a titer of 1:2


- consult ID recommends: titer of syphilis should be monitored with RPR 

testing. Repeat the test in 6 weeks to see if the titer becomes elevated.  


- DVT prophylaxis


- Supportive care





4/11 Patient received Risperidone last night 2100.  This AM patient noted to be 

acutely delusional.  Patient also evaluated by Dr. Vela who has placed 

patient under backer act.  EEG completed and normal.  Patient's family request 

IV fluids as patient has not eaten well today.  Will give IVF x 3 hours then 

transfer patient to inpatient psych for close monitoring and medication 

adjustment








Bipolar disorder


- Appreciate input from Psychiatry





HTN (hypertension)


 - patient BP noted to be elevated patient also agitated, elevated BP possibly 

related to agitation


- Increase Coreg to 6.235 mg BID


- add Clonidine 0.1 mg PO Q6H as needed for HTN








DM2 (diabetes mellitus, type 2)


- Hold metformin, resume at time of DC


- SSI


Pt Condition on Discharge:  Stable


Discharge Disposition:  Disc to Psych Care Fac


Discharge Instructions


DIET: Follow Instructions for:  As Tolerated, No Restrictions


Activities you can perform:  Regular-No Restrictions


Follow up Referrals:  


PCP Follow-up - 1 Week with Dr. Petersen


Psychiatry Adult - Today





New Medications:  


Carvedilol (Coreg) 6.25 Mg Tab


6.25 MG PO BID for blood pressure, #60 TAB 0 Refills





 


Continued Medications:  


Alprazolam (Xanax) 1 Mg Tab


1 MG PO DAILY PRN for ANXIETY, TAB 0 Refills





Atorvastatin (Atorvastatin) 80 Mg Tab


80 MG PO HS for Cholesterol Management, TAB 0 Refills





Donepezil (Donepezil) 10 Mg Tab


10 MG PO HS for Dementia, #30 TAB 0 Refills





Ergocalciferol (Ergocalciferol) 50,000 Unit Cap


48091 UNITS PO Q7D for Nutritional Supplement, #30 CAP 0 Refills





Folic Acid (Folic Acid) 1 Mg Tablet


1 MG PO DAILY





Metformin (Metformin) 1,000 Mg Tab


1000 MG PO BIDPC for Blood Sugar Management, #60 TAB 0 Refills


With meals


 


Discontinued Medications:  


Amitriptyline (Amitriptyline) 100 Mg Tab


100 MG PO HS for Control Depression, TAB 0 Refills





Carvedilol (Coreg) 3.125 Mg Tab


3.125 MG PO BID, #60 TAB 0 Refills





Naproxen (Naproxen) 500 Mg Tab


500 MG PO BID PRN for PAIN SCALE 1 TO 10, TAB 0 Refills





Risperidone (Risperdal) 1 Mg Tab


1 MG PO BID, #30 TAB 0 Refills








Additional Information


Patient examined.


Assessment and plan formulated with Nika Givens PA-C.


I agree with the above.











Nika Givens Apr 11, 2018 14:21


Jovi Coates DO Apr 13, 2018 23:17

## 2018-04-11 NOTE — HHI.DCPOC
Discharge Care Plan


Diagnosis:  


(1) Acute psychosis


(2) Bipolar disorder


(3) HTN (hypertension)


(4) DM2 (diabetes mellitus, type 2)


Goals to Promote Your Health


* To prevent worsening of your condition and complications


* To maintain your health at the optimal level


Directions to Meet Your Goals


*** Take your medications as prescribed


*** Follow your dietary instruction


*** Follow activity as directed








*** Keep your appointments as scheduled


*** Take your immunizations and boosters as scheduled


*** If your symptoms worsen call your PCP, if no PCP go to Urgent Care Center 

or Emergency Room***


*** Smoking is Dangerous to Your Health. Avoid second hand smoke***


***Call the 24-hour hour crisis hotline for domestic abuse at 1-311.908.5483***











Nika Givens Apr 11, 2018 14:12


Jovi Coates DO Apr 13, 2018 23:18

## 2018-04-11 NOTE — MG
cc:

Juan Ramon Kenney MD

****

 

 

DATE OF STUDY:

04/11/2018

 

EEG NUMBER:



 

YOB: 1955

 

CLINICAL HISTORY:

A 63-year-old female with a history of agitation and mood changes.

 

FINDINGS:

Well-formed alpha activity, 8-11 Hz, 10-40 microvolts.  Low-amplitude 

beta in the frontal channels.  Good anterior to posterior gradient.  

Occasional frontal myogenic artifact noted.

 

Reasonably good driving with photic stimulation.

 

Single lead EKG showing sinus rhythm.

 

INTERPRETATION:

Normal awake EEG.  Clinical correlation.

 

 

__________________________________

Juan Ramon Kenney MD

 

 

MG/SB

D: 04/11/2018, 01:21 PM

T: 04/11/2018, 01:41 PM

Visit #: [id number]

Job #: [id number]

## 2018-04-12 VITALS — HEART RATE: 72 BPM | DIASTOLIC BLOOD PRESSURE: 72 MMHG | SYSTOLIC BLOOD PRESSURE: 135 MMHG

## 2018-04-12 VITALS
TEMPERATURE: 97.8 F | RESPIRATION RATE: 16 BRPM | HEART RATE: 80 BPM | SYSTOLIC BLOOD PRESSURE: 99 MMHG | OXYGEN SATURATION: 100 % | DIASTOLIC BLOOD PRESSURE: 49 MMHG

## 2018-04-12 VITALS — DIASTOLIC BLOOD PRESSURE: 56 MMHG | HEART RATE: 95 BPM | SYSTOLIC BLOOD PRESSURE: 100 MMHG

## 2018-04-12 LAB
BUN SERPL-MCNC: 11 MG/DL (ref 7–18)
CALCIUM SERPL-MCNC: 9.2 MG/DL (ref 8.5–10.1)
CHLORIDE SERPL-SCNC: 103 MEQ/L (ref 98–107)
CHOLEST SERPL-MCNC: 203 MG/DL (ref 120–200)
CHOLESTEROL/ HDL RATIO: 4.57 RATIO
CREAT SERPL-MCNC: 1.08 MG/DL (ref 0.5–1)
GFR SERPLBLD BASED ON 1.73 SQ M-ARVRAT: 62 ML/MIN (ref 89–?)
GLUCOSE SERPL-MCNC: 173 MG/DL (ref 74–106)
HBA1C MFR BLD: 9.3 % (ref 4.3–6)
HCO3 BLD-SCNC: 26.6 MEQ/L (ref 21–32)
HDLC SERPL-MCNC: 44.4 MG/DL (ref 40–60)
LDLC SERPL-MCNC: 132 MG/DL (ref 0–99)
SODIUM SERPL-SCNC: 137 MEQ/L (ref 136–145)
TRIGL SERPL-MCNC: 134 MG/DL (ref 42–150)

## 2018-04-12 RX ADMIN — INSULIN ASPART SCH: 100 INJECTION, SOLUTION INTRAVENOUS; SUBCUTANEOUS at 08:00

## 2018-04-12 RX ADMIN — INSULIN ASPART SCH: 100 INJECTION, SOLUTION INTRAVENOUS; SUBCUTANEOUS at 11:02

## 2018-04-12 RX ADMIN — CARVEDILOL SCH MG: 6.25 TABLET, FILM COATED ORAL at 09:00

## 2018-04-12 RX ADMIN — INSULIN ASPART SCH: 100 INJECTION, SOLUTION INTRAVENOUS; SUBCUTANEOUS at 16:00

## 2018-04-12 NOTE — HHI.HP
Provisional Diagnosis


Admission Date


Apr 11, 2018 at 18:50


Axis I.


1.  Depersonalization-derealization disorder


   Rule-out occult organic cause despite workup on medical floor


Axis II.


Deferred





                               Certification of Person's Competence 


                           To Provide Express and Informed Consent





I have personally examined Hermelinda Cabezas , a person being served at 

[facility] on, Apr 12, 2018 11:39.


Express and informed consent means consent voluntarily given in writing, by a 

competent person, after sufficient explanation and disclosure of the subject 

matter involved to enable the person to make a knowing and willful decision 

without any element of force, fraud, deceit, duress, or other form of 

constraint or coercion.





This person is 18 years of age or older, is not now known to be incompetent to 

consent to treatment with a guardian advocate, and does not have a health care 

surrogate or proxy currently making medical treatment decisions.  I have found 

this person to be one of the following:





[x] Competent to provide express and informed consent, as defined above, for 

voluntary admission to this facility and is competent to provide express and 

informed consent for treatment.  He/she has the consistent capacity to make 

well reasoned, willful, and knowing decisions concerning his or her medical or 

mental health treatment.  The person fully and consistently understands the 

purpose of the admission for examination/placement and is fully capable of 

personally exercising all rights [place]





[] Incompetent to provide express and informed consent to voluntary admission, 

and this is incompetent to provide express and informed consent to treatment.  

The person must be transferred to involuntary status and a petition for a 

guardian advocate filed with the Circuit Court.





[] Refusing to provide express and informed consent to voluntary admission but 

is competent to provide express and informed consent for treatment.  The person 

must be discharged or transferred to involuntary status.





Form shall be completed within 24 hours of a person's arrival at the receiving 

facility and filed in the clinical record of each person:


1. Admitted on a voluntary basis


2. Permitted to provide express and informed consent to his/her own treatment


3. Allowed to transfer from involuntary to voluntary status


4. Prior to permitting a person to consent to his or her own treatment after 

having been previously found incompetent to consent to treatment.





History of Present Illness


Capacity:  Has Capacity


Psych Chief Complaint:  "I think I need to have my meds redone."


HPI


Ms. Cabezas is a 63 year-old female with a reported history of anxiety and 

depression who presents in transfer from the medical floor under a Vasquez act.  

The patient had presented there with altered mental status.  A medical workup 

was undertaken for organic causes of this altered mental status including head 

CT and EEG.  Dr. Vela saw the patient in psychiatric consultation, and I 

have reviewed his notes.  He notes a history of mild dementia and BPAD.  

Reviewing the electronic medical record, I note that the patient was admitted 

under Dr. Dominguez for a short stay with discharge diagnosis of acute psychosis 

and history of BPAD.





Patient seen and examined with nurse Barnard, counselor Jie and nursing student.

  Chart reviewed.  Case discussed with nursing staff.  Per nursing, patient has 

been no behavioral problem overnight.  Case discussed with counselor who has 

obtained reassuring collateral from patient's family; they are reportedly 

comfortable with accepting the patient home today.  I also discussed the case 

with Dr. Coates, who had attended the patient on the medical floor, and he 

notes interestingly that the patient seemed more symptomatic when daughter was 

present.  On my exam today, the patient is calm and cooperative.  She seems 

lucid and clear-thinking, and mental status testing is actually fairly good 

despite reported history of dementia, see below.  She complains primarily of 

perceptual problems that have occurred over the last year.  She notes that "

when I watch TV, the forms become distorted and it's scary.  Colors look like 

psychedelic color."  She describes other episodes where shapes of objects 

become "distorted."  This perceptual issue is intermittent, not continuous.  

She does not describe issues in other sensory modalities.  She notes that these 

perceptual disturbances are sometimes quite frightening for the patient and in 

response "I get out of control, grabby, reaching and pulling."  She denies any 

perceptions in the absence of stimulus (i.e. she denies hallucinations).  I can 

elicit no paranoia, no ideas of reference, no thought insertion or withdrawal 

or other delusional material.  She denies any issues with low mood, nor can I 

elicit any hopelessness, worthlessness or other depressive symptoms.  I can 

elicit no current or prior hypomanic or manic symptoms.  She denies any 

suicidal or homicidal ideation, intent or plan on direct questioning and 

contracts for safety.  The remainder of the psychiatric ROS is negative.  The 

patient has no acute physical complaints.  She is requesting discharge from the 

inpatient psychiatric unit today noting that she would like to have the 

requested medication changes in her chief complaint made on an outpatient basis.





Past psychiatric history: The patient reports a history of anxiety and 

depression.  She follows with Dr. Curiel from psychiatry and is prescribed Xanax 

and amitriptyline.  She is also prescribed Risperdal by Dr. Kenney and also 

takes Aricept.  She reports that she was most recently psychiatrically 

hospitalized under Dr. Dominguez.  She denies a history of suicide attempts.  

She denies a history of violent behavior, although she does note that she can 

become frantic during these perceptual episodes, see above.





Family history: The patient denies any family history of serious mental illness 

or suicide.





Chemical dependency history: The patient denies any abuse of drugs or alcohol.





Social history: The patient lives with her daughter and , a .  

She also has a son.  She has no grandchildren.  She has an associates degree 

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  She 

denies any  history.  Denies any legal history.  The patient denies any 

access to guns herself but notes that her  keeps a firearm locked and 

unloaded.  She has never had a suicide or violence plan involving a gun.  She 

is a Faith.  She denies a history of physical, verbal or sexual abuse or 

other trauma.





Review of Systems


Except as stated in HPI:  all other systems reviewed are Neg





Past Family Social History


Coded Allergies:  


     penicillin G (Unverified  Allergy, Intermediate, SWELLING, 4/9/18)


Past Medical History


Includes HTN and DM.  Patient also reports history of seizure but notes that 

she has been seizure free for 6 years.  See EMR.


Active Scripts


Carvedilol (Coreg) 6.25 Mg Tab, 6.25 MG PO BID for blood pressure, #60 TAB 0 

Refills


   Prov:Nika Givens         4/11/18


Reported Medications


Atorvastatin (Atorvastatin) 80 Mg Tab, 80 MG PO HS for Cholesterol Management, 

TAB 0 Refills


   4/9/18


Alprazolam (Xanax) 1 Mg Tab, 1 MG PO DAILY Y for ANXIETY, TAB 0 Refills


   4/9/18


Folic Acid (Folic Acid) 1 Mg Tablet, 1 MG PO DAILY


   6/21/17


Ergocalciferol (Ergocalciferol) 50,000 Unit Cap, 27197 UNITS PO Q7D for 

Nutritional Supplement, #30 CAP 0 Refills


   6/21/17


Donepezil (Donepezil) 10 Mg Tab, 10 MG PO HS for Dementia, #30 TAB 0 Refills


   6/21/17


Metformin (Metformin) 1,000 Mg Tab, 1000 MG PO BIDPC for Blood Sugar Management

, #60 TAB 0 Refills


   With meals


   6/21/17


Discontinued Reported Medications


Naproxen (Naproxen) 500 Mg Tab, 500 MG PO BID Y for PAIN SCALE 1 TO 10, TAB 0 

Refills


   4/9/18


Amitriptyline (Amitriptyline) 100 Mg Tab, 100 MG PO HS for Control Depression, 

TAB 0 Refills


   4/9/18


Carvedilol (Coreg) 3.125 Mg Tab, 3.125 MG PO BID, #60 TAB 0 Refills


   6/21/17


Risperidone (Risperdal) 1 Mg Tab, 1 MG PO BID, #30 TAB 0 Refills


   6/21/17


Amitriptyline (Amitriptyline) 150 Mg Tab, 150 MG PO HS for Control Depression, #

30 TAB 0 Refills


   6/21/17


Alprazolam (Xanax) 0.5 Mg Tab, 0.5 MG PO Q6H Y for ANXIETY, TAB 0 Refills


   6/21/17


Discontinued Scripts


Amitriptyline (Amitriptyline) 75 Mg Tab, 75 MG PO HS for Control Depression, #7 

TAB 0 Refills


   Prov:Stephon Dominguez MD         6/23/17


Risperidone (Risperdal) 1 Mg Tab, 1 MG PO BID for health, #14 TAB 0 Refills


   Prov:Stephon Dominguez MD         6/23/17


Alprazolam (Xanax) 0.5 Mg Tab, 0.5 MG PO HS Y for MILD ANXIETY, #7 TAB 0 Refills


   Prov:Stephon Dominguez MD         6/23/17





Current Medications








 Medications


  (Trade)  Dose


 Ordered  Sig/Jayde


 Route  Start Time


 Stop Time Status Last Admin


 


  (Lipitor)  80 mg  HS


 PO  4/11/18 21:00


    4/11/18 20:22


 


 


  (Coreg)  6.25 mg  BID


 PO  4/11/18 21:00


    4/11/18 20:22


 


 


  (Folate)  1 mg  DAILY


 PO  4/12/18 09:00


    4/12/18 09:00


 


 


  (Tylenol)  650 mg  Q4H  PRN


 PO  4/11/18 19:30


    4/12/18 09:24


 


 


  (Milk Of


 Magnesia Liq)  30 ml  DAILY  PRN


 PO  4/11/18 19:30


     


 


 


  (Mag-Al Plus


 Susp Liq)  30 ml  Q6H  PRN


 PO  4/11/18 19:30


     


 


 


  (Habitrol 21 Mg


 Patch.24 Hr)  1 patch  DAILY  PRN


 T-DERMAL  4/11/18 19:30


     


 


 


  (Romazicon Inj)  0.2 mg  Q1M  PRN


 IV PUSH  4/11/18 19:30


     


 


 


  (Ativan)  1 mg  Q4H  PRN


 PO  4/11/18 19:30


     


 


 


  (Ativan Inj)  1 mg  Q4H  PRN


 IV PUSH  4/11/18 19:30


     


 


 


  (Ativan)  2 mg  Q2H  PRN


 PO  4/11/18 19:30


     


 


 


  (Ativan Inj)  2 mg  Q2H  PRN


 IV PUSH  4/11/18 19:30


     


 


 


  (Ativan Inj)  2 mg  Q1H  PRN


 IV PUSH  4/11/18 19:30


     


 


 


  (Ativan Inj)  2 mg  Q15M  PRN


 IV PUSH  4/11/18 19:30


     


 


 


  (D50w (Vial) Inj)  50 ml  UNSCH  PRN


 IV PUSH  4/11/18 19:30


     


 


 


  (Glucagon Inj)  1 mg  UNSCH  PRN


 OTHER  4/11/18 19:30


     


 


 


  (NovoLOG


 SUPPLEMENTAL


 SCALE)  1  ACHS SLIDING  SCALE


 SQ  4/11/18 21:00


    4/12/18 11:02


 


 


 Miscellaneous


 Information  1  DAILY  PRN


 T-DERMAL  4/11/18 20:15


     


 








Patient's Strengths (min. 2)


Attending to basic needs.  Verbally fluent.





Physical Exam


Physical examination was completed by hospitalist on the medical floor.  On my 

examination today, the patient appears to be in no acute physical distress.  No 

motor abnormalities noted.  No signs of intoxication or withdrawal noted.  

Laboratories and vitals signs reviewed:


Vital Signs





Vital Signs








  Date Time  Temp Pulse Resp B/P (MAP) Pulse Ox O2 Delivery O2 Flow Rate FiO2


 


4/12/18 08:47  95  100/56 (71)    


 


4/12/18 05:56 97.8  16  100   








Lab Results











Item Value  Date Time


 


White Blood Count 5.8 TH/MM3 4/11/18 0640


 


Hemoglobin 12.8 GM/DL 4/11/18 0640


 


Platelet Count 319 TH/MM3 4/11/18 0640


 


Sodium Level 137 MEQ/L 4/12/18 0750


 


Potassium Level 3.6 MEQ/L 4/12/18 0750


 


Chloride Level 103 MEQ/L 4/12/18 0750


 


Carbon Dioxide Level 26.6 MEQ/L 4/12/18 0750


 


Blood Urea Nitrogen 11 MG/DL 4/12/18 0750


 


Creatinine 1.08 MG/DL H 4/12/18 0750


 


Random Glucose 173 MG/DL H 4/12/18 0750


 


Aspartate Amino Transf (AST/SGOT) 28 U/L 4/11/18 1240


 


Alanine Aminotransferase (ALT/SGPT) 21 U/L 4/11/18 1240


 


Alkaline Phosphatase 104 U/L 4/11/18 1240


 


Ammonia 10 MCMOL/L L 4/11/18 1240


 


Total Creatine Kinase 478 U/L H 4/9/18 1122


 


Vitamin B12 Level 455 PG/ML 4/11/18 1240


 


Folate GREATER THAN 20.0 NG/ML H 4/11/18 1240


 


Thyroid Stimulating Hormone 3rd Gen 0.565 uIU/ML 4/9/18 1122


 


Urine Opiates Screen NEG 4/9/18 1200


 


Urine Barbiturates Screen NEG 4/9/18 1200


 


Urine Amphetamines Screen NEG 4/9/18 1200


 


Urine Benzodiazepines Screen POS H 4/9/18 1200


 


Urine Cocaine Screen NEG 4/9/18 1200


 


Urine Cannabinoids Screen NEG 4/9/18 1200


 


Ethyl Alcohol Level LESS THAN 3 MG/DL 4/9/18 1122


 


Rapid Plasma Reagin Titer 1:2 H 4/9/18 1449


 


Rapid Plasma Reagin REACTIVE H 4/9/18 1449








RPR finding noted; ID consulted on medical floor and recommended follow up RPR 

outpatient.


EEG on medical floor read as normal, as was head CT.





Mental Status Examination


Appearance:  Appropriate


Consciousness:  Alert


Orientation:  x4


Motor Activity:  Normal gait


Speech:  Unremarkable


Language:  Adequate


Fund of Knowledge:  Adequate


Attention and Concentration:  Adequate


Mood:  Appropriate


Affect:  Appropriate


Thought Process & Associations:  Intact, Logical, Linear


Thought Content:  Appropriate


Hallucination Type:  None


Delusion Type:  None


Suicidal Ideation:  No


Suicidal Plan:  No


Suicidal Intention:  No


Homicidal Ideation:  No


Homicidal Plan:  No


Homicidal Intention:  No


Insight:  Adequate


Judgment:  Adequate


Mental Status Exam Remarks


Registration 3/3 and recall 2/3 at 5 min.  Serial 7's: 100, 73, 74, 63, 57 (

although there was a commotion on the unit when we she was making these 

calculations).  Able to spell WORLD backward with no errors.  Able to name 2 

items and repeat a phrase.  Able to interpret proverbs.  Gives last several 

presidents as Tommy, Obgregg, Sebastian.





Assessment & Plan


Problem List:  


(1) Depersonalization-derealization syndrome


ICD Codes:  F48.1 - Depersonalization-derealization syndrome


Assessment & Plan


This is a 63-year-old female with psychiatric history as detailed above who 

presents in transfer from the medical floor under a Vasquez act.  On my 

examination today, the patient reports intermittent episodes of visual 

perceptual disturbance.  The most appropriate psychiatric diagnosis would seem 

to be a depersonalization-derealization disorder.  These disturbances occur in 

isolation per patient report, and there are, for example, no reported 

associated symptoms of mood disorder or psychosis to suggest that the patient 

suffers from one of these types of illnesses presently.  She does report 

feeling anxious and fearful at times but only as a consequence of the 

perceptual disturbances.  Visual disturbances are certainly more characteristic 

of medical/neurological illness than psychiatric illness in general, and so 

although an extensive workup was undertaken on the medical floor, it is still 

possible that there is some occult medical cause that is driving her symptoms, 

such as an occipital-temporal epilepsy.  Medication effects are also possible.  

I have reviewed patient's outpatient medications with her and discussed that 

some (such as the Xanax and amitriptyline) may be associated with perceptual 

problems with use or withdrawal.  





Presently, the patient is requesting discharge from the inpatient psychiatric 

unit today.  She is not suicidal or homicidal.  There is no evidence of 

significant self-care deficit at present.  We have obtained reassuring 

collateral from family.  She does not meet criteria for involuntary psychiatric 

hospitalization.  I have recommended that she remain voluntarily for further 

observation and medication adjustment, but she has declined, and I have no 

basis to retain her over her objection.  I will discharge the patient home 

today once she has been medically cleared by the hospitalist with outpatient 

psychiatric follow-up as arranged by counselor.  The patient should also follow 

up with primary care and with neurology, and I have ordered seizure precautions 

on discharge.  I have instructed her to continue outpatient psychotropics, with 

which she is well versed, except that she should hold her Xanax as this is the 

most likely medication culprit in my estimation.  Further medication 

adjustments as per outpatient psychiatrist.  I have counseled the patient to 

abstain from any substances of abuse.  I have counseled patient regarding 

warning signs for need to return to the psychiatric emergency room as part of 

the general safety plan.  I have provided the patient prescriptions on 

discharge.  This note serves also as my discharge summary.


Request HC Surrog/Guard Advoc?:  No











Glynn Jeffery MD Apr 12, 2018 11:39

## 2018-04-12 NOTE — PD.CONS
HPI


Service


[facility] Hospitalists


Consult Requested By


Dr. Jeffery


Reason for Consult


medical management


Primary Care Physician


Unknown


Diagnoses:  


History of Present Illness


This is a 63 year old female patient with a past medical history which includes 

bipolar disorder, prior hospitalization for psychosis, hypertension, diabetes 

mellitus, chronic possible seizure disorder, possible memory loss/dementia.





Review of Systems


Constitutional:  DENIES: Fatigue, Fever, Chills


Eyes:  DENIES: Blurred vision, Diplopia, Vision loss


Respiratory:  DENIES: Cough, Sputum production, Shortness of breath


Cardiovascular:  DENIES: Chest pain, Palpitations, Dyspnea on Exertion, Lower 

Extremity Edema


Gastrointestinal:  DENIES: Abdominal pain, Constipation, Diarrhea, Nausea, 

Vomiting


Neurologic:  DENIES: Abnormal gait, Headache, Localized weakness


Psychiatric:  DENIES: Anxiety, Confusion, Depression





Past Family Social History


Past Medical History


1) bipolar disorder 


2) prior hospitalization for psychosis


3) hypertension


4) diabetes


5) seizure disorder?


6) memory loss, dementia?


Past Surgical History


1) tubal ligation, 1985


Reported Medications


Coreg (Carvedilol) 6.25 Mg Tab 6.25 Mg PO BID


Atorvastatin (Atorvastatin Calcium) 80 Mg Tab 80 Mg PO HS


Xanax (Alprazolam) 1 Mg Tab 1 Mg PO DAILY PRN


Folic Acid 1 Mg Tablet 1 Mg PO DAILY


Ergocalciferol 50,000 Unit Cap 50,000 Units PO Q7D


Donepezil 10 Mg Tab 10 Mg PO HS


Metformin (Metformin HCl) 1,000 Mg Tab 1,000 Mg PO BIDPC


     With meals


Allergies:  


Coded Allergies:  


     penicillin G (Unverified  Allergy, Intermediate, SWELLING, 4/9/18)


Family History


Noncontributory





Social History


- 


- Patient lives with  and daughter


- No tobacco


- No alcohol


- No illicit street drugs





Physical Exam


Vital Signs





Vital Signs








  Date Time  Temp Pulse Resp B/P (MAP) Pulse Ox O2 Delivery O2 Flow Rate FiO2


 


4/12/18 08:47  95  100/56 (71)    


 


4/12/18 05:56 97.8 80 16 99/49 (66) 100   


 


4/11/18 18:45 99.5 101 18 136/67 (90) 98   








Physical Exam


GENERAL: This is a well-nourished, well-developed patient, in no apparent 

distress.


SKIN: No rashes, ecchymoses or lesions. Cool and dry.


HEAD: Atraumatic. Normocephalic. No temporal or scalp tenderness.


EYES: Pupils equal round and reactive. Extraocular motions intact. No scleral 

icterus. No injection or drainage. 


ENT: Nose without bleeding, purulent drainage or septal hematoma. Throat 

without erythema, tonsillar hypertrophy or exudate. Uvula midline. Airway 

patent.


NECK: Trachea midline. No JVD or lymphadenopathy. Supple, nontender, no 

meningeal signs.


CARDIOVASCULAR: Regular rate and rhythm without murmurs, gallops, or rubs. 


RESPIRATORY: Clear to auscultation. Breath sounds equal bilaterally. No wheezes

, rales, or rhonchi.  


GASTROINTESTINAL: Abdomen soft, non-tender, nondistended. No hepato-splenomegaly

, or palpable masses. No guarding.


MUSCULOSKELETAL: Extremities without clubbing, cyanosis, or edema. No joint 

tenderness, effusion, or edema noted. No calf tenderness. Negative Homans sign 

bilaterally.


NEUROLOGICAL: Awake and alert. Cranial nerves II through XII intact.  Motor and 

sensory grossly within normal limits. Five out of 5 muscle strength in all 

muscle groups.  Normal speech.


Laboratory





Laboratory Tests








Test


  4/12/18


07:50


 


Blood Urea Nitrogen 11 


 


Creatinine 1.08 


 


Random Glucose 173 


 


Calcium Level 9.2 


 


Sodium Level 137 


 


Potassium Level 3.6 


 


Chloride Level 103 


 


Carbon Dioxide Level 26.6 


 


Anion Gap 7 


 


Estimat Glomerular Filtration


Rate 62 


 


 


Triglycerides Level 134 


 


Cholesterol Level 203 


 


LDL Cholesterol 132 


 


HDL Cholesterol 44.4 


 


Cholesterol/HDL Ratio 4.57 








Result Diagram:  


4/12/18 0750





Imaging


Last Impressions








Head CT 4/9/18 1104 Signed





Impressions: 





 Service Date/Time:  Monday, April 9, 2018 12:21 - CONCLUSION: Negative for an 





 acute process    Kulwant Ramachandran MD  FACR


 


Chest X-Ray 4/9/18 1104 Signed





Impressions: 





 Service Date/Time:  Monday, April 9, 2018 12:50 - CONCLUSION:  1. Platelike 





 airspace disease with associated mild volume loss in the left lower lobe most 





 consistent with atelectasis.     Jae Quintero MD 











Assessment and Plan


Problem List:  


(1) Altered mental status


ICD Codes:  R41.82 - Altered mental status, unspecified


Status:  Acute


Plan:  Altered mental status


acute delusion


- Patient is a pleasant 62 y/o female who was brought to the ER by her family 

due to altered mental status.


- Per ER reported that the patient was lethargic upon arrival. Per family 

recent medication dose change by psychiatry, Risperdal


- There is no information in the patient outpatient records 


- Patient alert and oriented, but not good historian as to her past medical 

history


- Patient does not appear septic


- IV Ativan prn agitation


- Appreciate Psychiatry consultation


- They felt that the pts AMS could have been related to sudden withdrawal of 

benzodiazepine vs. an underlying medical condition vs. other


- Psychiatry recommended continuing Risperdal 1 mg twice daily, Aricept 10 mg, 

Xanax 1 mg twice daily, but discontinuing Amitriptyline 


 due to his strong anticholinergic effects, including cognitive distortion, 

especially in patient with dementia. 


- Her lactic acid was 7.8 at admission and trended down to 2.5 a few hours 

following admission but this has started to trend back up. Most


 recent lactic acid is 3.7


- Blood cultures with NGTD


- Pt has been afebrile


- It would be helpful to have availability of patient's outpatient records.


- I will discuss case with [facility] case management, and see if outpatient records 

could be obtained from current/previous providers


And scanned into the [facility] EHR for future reference


- Repeat lactic acid in AM


- Pts RPR was noted to be positive with a titer of 1:2


- consult ID recommends: titer of syphilis should be monitored with RPR 

testing. Repeat the test in 6 weeks to see if the titer becomes elevated.  


- DVT prophylaxis


- Supportive care





4/11 Patient received Risperidone last night 2100.  This AM patient noted to be 

acutely delusional.  Patient also evaluated by Dr. Vela who has placed 

patient under backer act.  EEG completed and normal.  Patient's family request 

IV fluids as patient has not eaten well today.  Will give IVF x 3 hours then 

transfer patient to inpatient psych for close monitoring and medication 

adjustment





4/12 received call from psych department that they have lifted baker act are 

requesting medical clearance for DC











Bipolar disorder


- Appreciate input from Psychiatry





HTN (hypertension)


- Patient received Coreg to 6.25 mg last night with BP this AM 99/49


- recommending DC patient on her home dose of Coreg 3.125 PO BID


- also recommend patient follow up with her PCP for further BP monitoring and 

medication adjustment








DM2 (diabetes mellitus, type 2)


- Hold metformin, resume at time of DC


- SSI





Assessment and Plan


Patient examined.


Assessment and plan formulated with Nika Givens PA-C.


I agree with the above.





Pt had extensive w/u during hospitalization on medical floor prior to transfer 

to psychiatry. 


From the history and unremarkable w/u, pt most likely was over medicated on her 

psychiatric medications prior to [facility] admission. 


I was NOT able to obtain her outpt Psychiatric records, so NOT clear if pt had 

a recent medication increase or if she incorrectly took her medications. 


Pt to resume her aricept and metformin upon discharge. 


Pt to resume coreg at lower dose of 3.125mg BID. 


Pt to f/u with her PCP in one week. 


Pt to f/u with her psychiatrist in one week, Dr. Curiel.


Case d/w Dr. Jeffery (4/12/18). 


I am derring treatment of pt's Psychiatric disorders to the Psychiatry service. 


Pt is medically cleared for discharge to home.











Nika Givens Apr 12, 2018 15:12


Jovi Coates DO Apr 12, 2018 17:51

## 2018-06-18 ENCOUNTER — HOSPITAL ENCOUNTER (EMERGENCY)
Dept: HOSPITAL 17 - NEPC | Age: 63
Discharge: HOME | End: 2018-06-18
Payer: MEDICARE

## 2018-06-18 VITALS
RESPIRATION RATE: 18 BRPM | DIASTOLIC BLOOD PRESSURE: 92 MMHG | HEART RATE: 116 BPM | OXYGEN SATURATION: 100 % | SYSTOLIC BLOOD PRESSURE: 154 MMHG

## 2018-06-18 VITALS
OXYGEN SATURATION: 99 % | SYSTOLIC BLOOD PRESSURE: 144 MMHG | HEART RATE: 119 BPM | RESPIRATION RATE: 18 BRPM | DIASTOLIC BLOOD PRESSURE: 64 MMHG

## 2018-06-18 VITALS
SYSTOLIC BLOOD PRESSURE: 118 MMHG | HEART RATE: 115 BPM | RESPIRATION RATE: 18 BRPM | DIASTOLIC BLOOD PRESSURE: 57 MMHG | TEMPERATURE: 99 F | OXYGEN SATURATION: 98 %

## 2018-06-18 DIAGNOSIS — Z87.19: ICD-10-CM

## 2018-06-18 DIAGNOSIS — Z87.448: ICD-10-CM

## 2018-06-18 DIAGNOSIS — Z86.69: ICD-10-CM

## 2018-06-18 DIAGNOSIS — Z79.84: ICD-10-CM

## 2018-06-18 DIAGNOSIS — I10: ICD-10-CM

## 2018-06-18 DIAGNOSIS — R94.31: ICD-10-CM

## 2018-06-18 DIAGNOSIS — E11.9: ICD-10-CM

## 2018-06-18 DIAGNOSIS — B95.4: ICD-10-CM

## 2018-06-18 DIAGNOSIS — K21.9: ICD-10-CM

## 2018-06-18 DIAGNOSIS — E87.6: ICD-10-CM

## 2018-06-18 DIAGNOSIS — R00.0: ICD-10-CM

## 2018-06-18 DIAGNOSIS — B95.7: ICD-10-CM

## 2018-06-18 DIAGNOSIS — B37.89: ICD-10-CM

## 2018-06-18 DIAGNOSIS — Z87.39: ICD-10-CM

## 2018-06-18 DIAGNOSIS — R41.0: Primary | ICD-10-CM

## 2018-06-18 DIAGNOSIS — E78.5: ICD-10-CM

## 2018-06-18 DIAGNOSIS — Z87.09: ICD-10-CM

## 2018-06-18 DIAGNOSIS — F31.9: ICD-10-CM

## 2018-06-18 DIAGNOSIS — E87.2: ICD-10-CM

## 2018-06-18 DIAGNOSIS — Z79.899: ICD-10-CM

## 2018-06-18 LAB
ALBUMIN SERPL-MCNC: 3.3 GM/DL (ref 3.4–5)
ALP SERPL-CCNC: 77 U/L (ref 45–117)
ALT SERPL-CCNC: 29 U/L (ref 10–53)
APAP SERPL-MCNC: (no result) MCG/ML (ref 10–30)
AST SERPL-CCNC: 23 U/L (ref 15–37)
BACTERIA #/AREA URNS HPF: (no result) /HPF
BASOPHILS # BLD AUTO: 0 TH/MM3 (ref 0–0.2)
BASOPHILS NFR BLD: 0.3 % (ref 0–2)
BILIRUB SERPL-MCNC: 0.8 MG/DL (ref 0.2–1)
BUN SERPL-MCNC: 6 MG/DL (ref 7–18)
CALCIUM SERPL-MCNC: 8.5 MG/DL (ref 8.5–10.1)
CHLORIDE SERPL-SCNC: 104 MEQ/L (ref 98–107)
COLOR UR: YELLOW
CREAT SERPL-MCNC: 1.25 MG/DL (ref 0.5–1)
CRP SERPL-MCNC: 2.04 MG/DL (ref 0–0.3)
EOSINOPHIL # BLD: 0 TH/MM3 (ref 0–0.4)
EOSINOPHIL NFR BLD: 0 % (ref 0–4)
ERYTHROCYTE [DISTWIDTH] IN BLOOD BY AUTOMATED COUNT: 15.7 % (ref 11.6–17.2)
GFR SERPLBLD BASED ON 1.73 SQ M-ARVRAT: 52 ML/MIN (ref 89–?)
GLUCOSE SERPL-MCNC: 206 MG/DL (ref 74–106)
GLUCOSE UR STRIP-MCNC: 50 MG/DL
HCO3 BLD-SCNC: 19.4 MEQ/L (ref 21–32)
HCT VFR BLD CALC: 38.5 % (ref 35–46)
HGB BLD-MCNC: 12.5 GM/DL (ref 11.6–15.3)
HGB UR QL STRIP: (no result)
HYALINE CASTS #/AREA URNS LPF: 3 /LPF
INR PPP: 1.1 RATIO
KETONES UR STRIP-MCNC: 20 MG/DL
LACTIC ACID SEPSIS PROTOCOL: 5.9 MMOL/L (ref 0.4–2)
LYMPHOCYTES # BLD AUTO: 1.3 TH/MM3 (ref 1–4.8)
LYMPHOCYTES NFR BLD AUTO: 17.5 % (ref 9–44)
MAGNESIUM SERPL-MCNC: 1.6 MG/DL (ref 1.5–2.5)
MCH RBC QN AUTO: 27.4 PG (ref 27–34)
MCHC RBC AUTO-ENTMCNC: 32.4 % (ref 32–36)
MCV RBC AUTO: 84.7 FL (ref 80–100)
MONOCYTE #: 0.4 TH/MM3 (ref 0–0.9)
MONOCYTES NFR BLD: 5.2 % (ref 0–8)
MUCOUS THREADS #/AREA URNS LPF: (no result) /LPF
NEUTROPHILS # BLD AUTO: 5.9 TH/MM3 (ref 1.8–7.7)
NEUTROPHILS NFR BLD AUTO: 77 % (ref 16–70)
NITRITE UR QL STRIP: (no result)
PLATELET # BLD: 294 TH/MM3 (ref 150–450)
PMV BLD AUTO: 7.6 FL (ref 7–11)
PROT SERPL-MCNC: 6.7 GM/DL (ref 6.4–8.2)
PROTHROMBIN TIME: 11.6 SEC (ref 9.8–11.6)
RBC # BLD AUTO: 4.55 MIL/MM3 (ref 4–5.3)
SODIUM SERPL-SCNC: 143 MEQ/L (ref 136–145)
SP GR UR STRIP: 1.01 (ref 1–1.03)
SQUAMOUS #/AREA URNS HPF: <1 /HPF (ref 0–5)
TROPONIN I SERPL-MCNC: (no result) NG/ML (ref 0.02–0.05)
URINE LEUKOCYTE ESTERASE: (no result)
WBC # BLD AUTO: 7.7 TH/MM3 (ref 4–11)

## 2018-06-18 PROCEDURE — 80307 DRUG TEST PRSMV CHEM ANLYZR: CPT

## 2018-06-18 PROCEDURE — 82550 ASSAY OF CK (CPK): CPT

## 2018-06-18 PROCEDURE — 87040 BLOOD CULTURE FOR BACTERIA: CPT

## 2018-06-18 PROCEDURE — 87086 URINE CULTURE/COLONY COUNT: CPT

## 2018-06-18 PROCEDURE — 80053 COMPREHEN METABOLIC PANEL: CPT

## 2018-06-18 PROCEDURE — 99285 EMERGENCY DEPT VISIT HI MDM: CPT

## 2018-06-18 PROCEDURE — 86140 C-REACTIVE PROTEIN: CPT

## 2018-06-18 PROCEDURE — 83605 ASSAY OF LACTIC ACID: CPT

## 2018-06-18 PROCEDURE — 83735 ASSAY OF MAGNESIUM: CPT

## 2018-06-18 PROCEDURE — 71045 X-RAY EXAM CHEST 1 VIEW: CPT

## 2018-06-18 PROCEDURE — 85025 COMPLETE CBC W/AUTO DIFF WBC: CPT

## 2018-06-18 PROCEDURE — 93005 ELECTROCARDIOGRAM TRACING: CPT

## 2018-06-18 PROCEDURE — 83690 ASSAY OF LIPASE: CPT

## 2018-06-18 PROCEDURE — 81001 URINALYSIS AUTO W/SCOPE: CPT

## 2018-06-18 PROCEDURE — 85730 THROMBOPLASTIN TIME PARTIAL: CPT

## 2018-06-18 PROCEDURE — 96365 THER/PROPH/DIAG IV INF INIT: CPT

## 2018-06-18 PROCEDURE — 87077 CULTURE AEROBIC IDENTIFY: CPT

## 2018-06-18 PROCEDURE — 82552 ASSAY OF CPK IN BLOOD: CPT

## 2018-06-18 PROCEDURE — 87205 SMEAR GRAM STAIN: CPT

## 2018-06-18 PROCEDURE — 84484 ASSAY OF TROPONIN QUANT: CPT

## 2018-06-18 PROCEDURE — 96375 TX/PRO/DX INJ NEW DRUG ADDON: CPT

## 2018-06-18 PROCEDURE — 84443 ASSAY THYROID STIM HORMONE: CPT

## 2018-06-18 PROCEDURE — 85610 PROTHROMBIN TIME: CPT

## 2018-06-18 PROCEDURE — 96368 THER/DIAG CONCURRENT INF: CPT

## 2018-06-18 PROCEDURE — 86403 PARTICLE AGGLUT ANTBDY SCRN: CPT

## 2018-06-18 PROCEDURE — 96367 TX/PROPH/DG ADDL SEQ IV INF: CPT

## 2018-06-18 PROCEDURE — 87186 SC STD MICRODIL/AGAR DIL: CPT

## 2018-06-18 PROCEDURE — 82140 ASSAY OF AMMONIA: CPT

## 2018-06-18 RX ADMIN — POTASSIUM CHLORIDE SCH MLS/HR: 200 INJECTION, SOLUTION INTRAVENOUS at 08:36

## 2018-06-18 RX ADMIN — POTASSIUM CHLORIDE SCH MLS/HR: 200 INJECTION, SOLUTION INTRAVENOUS at 11:16

## 2018-06-18 RX ADMIN — POTASSIUM CHLORIDE SCH MLS/HR: 200 INJECTION, SOLUTION INTRAVENOUS at 11:30

## 2018-06-18 RX ADMIN — POTASSIUM CHLORIDE SCH MLS/HR: 200 INJECTION, SOLUTION INTRAVENOUS at 13:30

## 2018-06-18 NOTE — EKG
Date Performed: 06/18/2018       Time Performed: 06:09:11

 

PTAGE:      63 years

 

EKG:      SINUS TACHYCARDIA POSSIBLE RIGHT ATRIAL ENLARGEMENT POSSIBLE LEFT ATRIAL ENLARGEMENT POSSIB
LE RIGHT VENTRICULAR CONDUCTION DELAY NONSPECIFIC ST & T-WAVE ABNORMALITY ABNORMAL RHYTHM ECG

 

PREVIOUS TRACING       : 04/09/2018 11.45

 

DOCTOR:   Sandra Dinero  Interpretating Date/Time  06/18/2018 22:53:25

## 2018-06-18 NOTE — RADRPT
EXAM DATE:  6/18/2018 6:34 AM EDT

AGE/SEX:        63 years / Female



INDICATIONS:  Altered mental status.



CLINICAL DATA:  This is the patient's initial encounter. Patient reports that signs and symptoms have
 been present for 1 day and indicates a pain score of Nonresponsive. 

                                                                          

MEDICAL/SURGICAL HISTORY:       Non-responsive. Non-responsive.



COMPARISON:      [facility], CHEST SINGLE AP, 4/9/2018.  . 





FINDINGS:  

A single AP view of the chest demonstrates the lungs to be symmetrically aerated without evidence of 
mass, infiltrate or effusion.  The cardiomediastinal contours are unremarkable.  Osseous structures a
re intact.  





CONCLUSION: 

Negative examination.



Electronically signed by: Jordan Beck MD  6/18/2018 6:37 AM EDT

## 2018-06-18 NOTE — PD
HPI


Chief Complaint:  Altered Mental Status


Time Seen by Provider:  06:06


Travel History


International Travel<30 days:  No (UNABLE TO OBTAIN)


Contact w/Intl Traveler<30days:  No (UNABLE TO OBTAIN)


Traveled to known affect area:  No (UNABLE TO OBTAIN)





History of Present Illness


HPI


The patient is a 63 year old female who presents to the [facility] 

emergency department with a history of altered mentation that began last night 

around 9:30 PM.  The patient became agitated according to the patient's family.

  The patient throughout the evening became increasingly agitated and somewhat 

combative, therefore please any ambulance services were called out.  The patient

's daughter attempted to administer the patient's Seroquel to the patient, 

however they are unsure whether she swallowed it.  The patient does have a 

history of bipolar disorder.  The patient's family are unsure if the patient 

has been taking her Seroquel as prescribed.  The patient was noted to be 

tachycardic by ambulance services in the 140s.  The patient reportedly had a 

urinary tract infection that she is currently on ciprofloxacin for.  She has 2 

more days left in the prescription.  The patient on arrival is unable or 

unwilling to answer questions.  The patient is uncooperative with initial 

examination.  The patient was placed in soft restraints for her and the staff 

safety.  According to ambulance services the patient was pinching flailing her 

arms prior to arrival.  No other history regarding recent review of systems is 

able to be obtained from this patient.  The patient's electronic medical record 

is reviewed for her past medical history.





[facility]


Past Medical History


*** Narrative Medical


The patient's past medical history is significant for diabetes mellitus, blood 

sugar prior to arrival today was 180, hyperlipidemia, hypertension, bipolar 

disorder, acid reflux, asthma.


Arthritis:  No


Asthma:  Yes


Blood Disorders:  No


Bipolar Disorder:  Yes


Anxiety:  Yes


Depression:  Yes


Cancer:  No


Cardiovascular Problems:  Yes


High Cholesterol:  Yes


Diabetes:  Yes (BORDERLINE DIABETES TYPE 2)


Patient Takes Glucophage:  No (UNABLE TO OBTAIN)


Diminished Hearing:  No


Endocrine:  No


Gastrointestinal Disorders:  Yes


GERD:  Yes


Glaucoma:  Yes


Genitourinary:  Yes


Hypertension:  Yes


Immune Disorder:  No


Musculoskeletal:  Yes


Neurologic:  Yes


Psychiatric:  Yes (BIPOLAR )


Reproductive:  No


Respiratory:  Yes


Seizures:  Yes


Sickle Cell Disease:  No


Tetanus Vaccination:  Unknown


Tubal Ligation:  Yes





Past Surgical History


*** Narrative Surgical


The patient's past surgical history is unable to be obtained from the patient.


Abdominal Surgery:  No


Cardiac Surgery:  No


Ear Surgery:  No


Endocrine Surgery:  No


Eye Surgery:  No


Genitourinary Surgery:  No


Gynecologic Surgery:  Yes


Oral Surgery:  No


Thoracic Surgery:  No


Other Surgery:  Yes (TUBAL LIGATION 1985)





Social History


Alcohol Use:  No


Tobacco Use:  No


Substance Use:  No





Allergies-Medications


(Allergen,Severity, Reaction):  


Coded Allergies:  


     penicillin G (Unverified  Allergy, Intermediate, SWELLING, 6/18/18)


Reported Meds & Prescriptions





Reported Meds & Active Scripts


Active


Coreg (Carvedilol) 3.125 Mg Tab 3.125 Mg PO BID


Reported


Seroquel (Quetiapine Fumarate) 50 Mg Tab 50 Mg PO HS


Remeron (Mirtazapine) 15 Mg Tab 15 Mg PO HS


Vitamin D3 (Cholecalciferol) 1,000 Unit Cap Unknown Dose  DAILY


Alprazolam 0.5 Mg Tab 0.5 Mg PO Q4H PRN


Atorvastatin (Atorvastatin Calcium) 80 Mg Tab 80 Mg PO HS


Ergocalciferol 50,000 Unit Cap 50,000 Units PO Q7D


Donepezil 10 Mg Tab 10 Mg PO HS


Metformin (Metformin HCl) 1,000 Mg Tab 500 Mg PO BIDPC


     With meals








Review of Systems


ROS Limitations:  Altered Mental Status, Refused, Poor Historian





Physical Exam


Narrative


General: 


The patient is a well-developed well-nourished female, agitated on arrival, 

intermittently crying out and moving all extremities and soft restraints.





Head and Neck exam: 


Head is normocephalic atraumatic. 


Eyes: EOMI, pupils are equal round and reactive to light. 


Nose: Midline septum with pink mucous membranes 


Mouth: Dentition unremarkable. Moist mucus membranes. Posterior oropharynx is 

not erythematous. No tonsillar hypertrophy. Uvula midline. Airway patent. 


Neck: No palpable lymphadenopathy. No nuchal rigidity. No thyromegaly. 





Cardiovascular: 


Sinus tachycardia in the 1 teens to 120 without murmurs, gallops, or rubs. No 

pulse deficit to the extremities on simultaneous auscultation and palpation of 

her radial artery. 





Lungs: 


Clear to auscultation bilaterally. No wheezes, rhonchi, or rales.


 


Abdomen:


Soft, without tenderness to palpation in all 4 quadrants of the abdomen. No 

guarding, rebound, or rigidity.  Normal bowel sounds are audible.  No 

tenderness on palpation of McBurney's point.





Extremities: 


No clubbing, cyanosis, or edema. 2+ pulses in all 4 extremities. 





Back: 


No costovertebral angle tenderness to palpation. 





Neurologic Exam: The patient repeatedly says never.  The patient is 

uncooperative with formal neurologic testing, however she has no obvious facial 

asymmetry.  She is moving all extremities equally with 5/5 strength.  The 

patient has intact sensation over all dermatomes.





Skin Exam: No rash noted. Intact skin that is warm and dry.





Data


Data


Last Documented VS





Vital Signs








  Date Time  Temp Pulse Resp B/P (MAP) Pulse Ox O2 Delivery O2 Flow Rate FiO2


 


6/18/18 06:13 99.0 115 18 118/57 (77) 98   








Orders





 Orders


Electrocardiogram (6/18/18 06:15)


Complete Blood Count With Diff (6/18/18 06:15)


Comprehensive Metabolic Panel (6/18/18 06:15)


Creatine Kinase (Cpk) (6/18/18 06:15)


Ckmb (Isoenzyme) Profile (6/18/18 06:15)


Troponin I (6/18/18 06:15)


Prothrombin Time / Inr (Pt) (6/18/18 06:15)


Act Partial Throm Time (Ptt) (6/18/18 06:15)


Blood Culture (6/18/18 06:15)


C-Reactive Protein (Crp) (6/18/18 06:15)


Lipase (6/18/18 06:15)


Urinalysis - C+S If Indicated (6/18/18 06:15)


Magnesium (Mg) (6/18/18 06:15)


Ammonia (6/18/18 06:15)


Thyroid Stimulating Hormone (6/18/18 06:15)


Chest, Single Ap (6/18/18 06:15)


Ct Brain W/O Iv Contrast(Rout) (6/18/18 06:15)


Iv Access Insert/Monitor (6/18/18 06:15)


Ecg Monitoring (6/18/18 06:15)


Oximetry (6/18/18 06:15)


Drug Screen, Random Urine (6/18/18 06:15)


Alcohol (Ethanol) (6/18/18 06:15)


Salicylates (Aspirin) (6/18/18 06:15)


Tylenol (Acetaminophen) (6/18/18 06:15)


Lactic Acid Sepsis Protocol (6/18/18 06:15)


Sodium Chlor 0.9% 1000 Ml Inj (Ns 1000 M (6/18/18 06:15)


Lorazepam Inj (Ativan Inj) (6/18/18 06:15)


Vancomycin Inj (Vancomycin Inj) (6/18/18 06:15)


Aztreonam Inj (Azactam Inj) (6/18/18 06:15)


Metronidazole 500 Mg Inj (Flagyl 500 Mg (6/18/18 06:15)


Cath For Specimen (6/18/18 07:03)





Labs





Laboratory Tests








Test


  6/18/18


06:20 6/18/18


06:25


 


Salicylates Level


  LESS THAN 1.7


MG/DL 


 


 


Ammonia  18 MCMOL/L 











MDM


Medical Decision Making


Medical Screen Exam Complete:  Yes


Emergency Medical Condition:  Yes


Medical Record Reviewed:  Yes


Differential Diagnosis


Sepsis, versus hepatic encephalopathy, versus urinary tract infection, versus 

intracranial abnormality, versus pneumonia, versus decompensated psychiatric 

disorder


Narrative Course


During the course of the patient's emergency department visit, the patient's 

history, examination, and differential diagnosis were reviewed with the 

patient. The patient was placed on a cardiac monitor with oximetry and frequent 

blood pressure monitoring. The patient had  IV access obtained and blood work 

sent for analysis.  The patient had an EKG done on arrival.  The patient's EKG 

shows a sinus tachycardia rate of 117, QRS duration 72 ms,  ms.  No 

acute ST segment elevation.  T waves are inverted in V1, V2, nonspecific ST-T 

wave abnormalities are noted.  Blood cultures 2 were drawn, lactic acid was 

sent for analysis.





The patient was initially provided Ativan 1 mg IV, normal saline 1 L IV fluid 

bolus, broad-spectrum antibiotic was started as the patient is tachycardic with 

a reported history of recent urinary which could be causing sepsis, therefore 

the patient was started on the Azactam, vancomycin, Flagyl, given her 

penicillin allergy.





The patient's laboratory studies were reviewed and remarkable for an ammonia 

level that is within normal limits any, ruling out hepatic encephalopathy as a 

cause of the patient's altered mentation, salicylate less than 1.7





Radiology studies were reviewed and remarkable for a chest x-ray that shows no 

acute abnormality.





The patient's other laboratory studies, CT scan of the brain are pending at the 

conclusion of my shift.  The patient's case will be checked out to the oncoming 

emergency physician to disposition the patient based on the conclusion of her 

workup.





Diagnosis





 Primary Impression:  


 Altered mental status


 Qualified Codes:  R41.0 - Disorientation, unspecified











Janie Velazquez MD Jun 18, 2018 06:32

## 2018-06-18 NOTE — PD
Physical Exam


Date Seen by Provider:  Jun 18, 2018


Narrative


I assumed care of this patient at 7 AM from Dr. Velazquez.  The patient had been 

brought in with the chief complaint of altered mental status.  The patient has 

a known history of a psychiatric disorder.  She has been refusing her 

medication.  The family presumed that her symptoms were secondary to her 

psychiatric history.  They did try to force a Seroquel but do not believe that 

they were successful.  Her symptoms were escalating prompting the visit to the 

emergency department.





On my arrival this morning, the patient's daughter states that the patient is 

totally back to baseline.  The daughter further states that the patient is 

currently being treated for urinary tract infection.  He has 2 days left of 

Cipro.





GENERAL: Awake and alert and in no acute distress.


SKIN: Warm and dry.  Normal color and turgor.


HEAD: Normocephalic/atraumatic.


EYES: Pupils are equal.  Extraocular movements are intact.


NECK: Normal range of motion.  Supple.


CARDIOVASCULAR: Regular rate and rhythm.


RESPIRATORY: Nonlabored respirations.  Normal sats.


MUSCULOSKELETAL: Atraumatic.  Normal muscle tone.


NEUROLOGICAL: A and O 3.  Nonfocal.


PSYCHIATRIC: Appropriate mood and affect.





Data


Data


Last Documented VS





Vital Signs








  Date Time  Temp Pulse Resp B/P (MAP) Pulse Ox O2 Delivery O2 Flow Rate FiO2


 


6/18/18 10:05  116 18 154/92 (112) 100 Room Air  


 


6/18/18 06:13 99.0       








Orders





 Orders


Complete Blood Count With Diff (6/18/18 06:15)


Comprehensive Metabolic Panel (6/18/18 06:15)


Creatine Kinase (Cpk) (6/18/18 06:15)


Ckmb (Isoenzyme) Profile (6/18/18 06:15)


Troponin I (6/18/18 06:15)


Prothrombin Time / Inr (Pt) (6/18/18 06:15)


Act Partial Throm Time (Ptt) (6/18/18 06:15)


Blood Culture (6/18/18 06:15)


C-Reactive Protein (Crp) (6/18/18 06:15)


Lipase (6/18/18 06:15)


Urinalysis - C+S If Indicated (6/18/18 06:15)


Magnesium (Mg) (6/18/18 06:15)


Ammonia (6/18/18 06:15)


Thyroid Stimulating Hormone (6/18/18 06:15)


Chest, Single Ap (6/18/18 06:15)


Iv Access Insert/Monitor (6/18/18 06:15)


Ecg Monitoring (6/18/18 06:15)


Oximetry (6/18/18 06:15)


Drug Screen, Random Urine (6/18/18 06:15)


Alcohol (Ethanol) (6/18/18 06:15)


Salicylates (Aspirin) (6/18/18 06:15)


Tylenol (Acetaminophen) (6/18/18 06:15)


Lactic Acid Sepsis Protocol (6/18/18 06:15)


Sodium Chlor 0.9% 1000 Ml Inj (Ns 1000 M (6/18/18 06:15)


Lorazepam Inj (Ativan Inj) (6/18/18 06:15)


Vancomycin Inj (Vancomycin Inj) (6/18/18 06:15)


Aztreonam Inj (Azactam Inj) (6/18/18 06:15)


Metronidazole 500 Mg Inj (Flagyl 500 Mg (6/18/18 06:15)


Cath For Specimen (6/18/18 07:03)


CKMB (6/18/18 06:20)


CKMB% (6/18/18 06:20)


Sodium Chlor 0.9% 1000 Ml Inj (Ns 1000 M (6/18/18 07:30)


Potassium Chlor 20 Meq Premix (Kcl 20 Me (6/18/18 07:30)


Urine Culture (6/18/18 07:31)


Electrocardiogram (6/18/18 )


Potassium Chloride (Kcl) (6/18/18 09:00)


Potassium Chloride (Kcl) (6/18/18 10:45)





Labs





Laboratory Tests








Test


  6/18/18


06:20 6/18/18


06:25 6/18/18


07:31 6/18/18


08:51


 


Salicylates Level


  LESS THAN 1.7


MG/DL 


  


  


 


 


Ammonia  18 MCMOL/L   


 


Urine Color   YELLOW  


 


Urine Turbidity   HAZY  


 


Urine pH   5.0  


 


Urine Specific Gravity   1.010  


 


Urine Protein   NEG mg/dL  


 


Urine Glucose (UA)   50 mg/dL  


 


Urine Ketones   20 mg/dL  


 


Urine Occult Blood   SMALL  


 


Urine Nitrite   NEG  


 


Urine Bilirubin   NEG  


 


Urine Urobilinogen


  


  


  LESS THAN 2


mg/dL 


 


 


Urine Leukocyte Esterase   SMALL  


 


Urine RBC   5 /hpf  


 


Urine WBC   2 /hpf  


 


Urine Squamous Epithelial


Cells 


  


  <1 /hpf 


  


 


 


Urine Bacteria   MOD /hpf  


 


Urine Hyaline Casts   3 /lpf  


 


Urine Mucus   FEW /lpf  


 


Microscopic Urinalysis Comment


  


  


  CULTURE


INDICATED 


 


 


Urine Opiates Screen   NEG  


 


Urine Barbiturates Screen   NEG  


 


Urine Amphetamines Screen   NEG  


 


Urine Benzodiazepines Screen   POS  


 


Urine Cocaine Screen   NEG  


 


Urine Cannabinoids Screen   NEG  


 


White Blood Count    7.7 TH/MM3 


 


Red Blood Count    4.55 MIL/MM3 


 


Hemoglobin    12.5 GM/DL 


 


Hematocrit    38.5 % 


 


Mean Corpuscular Volume    84.7 FL 


 


Mean Corpuscular Hemoglobin    27.4 PG 


 


Mean Corpuscular Hemoglobin


Concent 


  


  


  32.4 % 


 


 


Red Cell Distribution Width    15.7 % 


 


Platelet Count    294 TH/MM3 


 


Mean Platelet Volume    7.6 FL 


 


Neutrophils (%) (Auto)    77.0 % 


 


Lymphocytes (%) (Auto)    17.5 % 


 


Monocytes (%) (Auto)    5.2 % 


 


Eosinophils (%) (Auto)    0.0 % 


 


Basophils (%) (Auto)    0.3 % 


 


Neutrophils # (Auto)    5.9 TH/MM3 


 


Lymphocytes # (Auto)    1.3 TH/MM3 


 


Monocytes # (Auto)    0.4 TH/MM3 


 


Eosinophils # (Auto)    0.0 TH/MM3 


 


Basophils # (Auto)    0.0 TH/MM3 


 


CBC Comment    DIFF FINAL 


 


Differential Comment     


 


Prothrombin Time    11.6 SEC 


 


Prothromb Time International


Ratio 


  


  


  1.1 RATIO 


 


 


Activated Partial


Thromboplast Time 


  


  


  20.1 SEC 


 


 


Blood Urea Nitrogen    6 MG/DL 


 


Creatinine    1.25 MG/DL 


 


Random Glucose    206 MG/DL 


 


Total Protein    6.7 GM/DL 


 


Albumin    3.3 GM/DL 


 


Calcium Level    8.5 MG/DL 


 


Magnesium Level    1.6 MG/DL 


 


Alkaline Phosphatase    77 U/L 


 


Aspartate Amino Transf


(AST/SGOT) 


  


  


  23 U/L 


 


 


Alanine Aminotransferase


(ALT/SGPT) 


  


  


  29 U/L 


 


 


Total Bilirubin    0.8 MG/DL 


 


Sodium Level    143 MEQ/L 


 


Potassium Level    2.4 MEQ/L 


 


Chloride Level    104 MEQ/L 


 


Carbon Dioxide Level    19.4 MEQ/L 


 


Anion Gap    20 MEQ/L 


 


Estimat Glomerular Filtration


Rate 


  


  


  52 ML/MIN 


 


 


Lactic Acid Level    5.9 mmol/L 


 


Total Creatine Kinase    105 U/L 


 


Creatine Kinase MB    0.9 NG/ML 


 


Troponin I


  


  


  


  LESS THAN 0.02


NG/ML


 


C-Reactive Protein    2.04 MG/DL 


 


Lipase    71 U/L 


 


Thyroid Stimulating Hormone


3rd Gen 


  


  


  3.660 uIU/ML 


 


 


Acetaminophen Level


  


  


  


  LESS THAN 2.0


MCG/ML


 


Ethyl Alcohol Level


  


  


  


  LESS THAN 3


MG/DL


 


Test


  6/18/18


12:20 


  


  


 


 


Lactic Acid Level 1.6 mmol/L    











MDM


Supervised Visit with LEONIDAS:  No


Narrative Course





Vital Signs








  Date Time  Temp Pulse Resp B/P (MAP) Pulse Ox O2 Delivery O2 Flow Rate FiO2


 


6/18/18 07:00  119 18 144/64 (90) 99 Room Air  


 


6/18/18 06:13 99.0 115 18 118/57 (77) 98   








CBC & BMP Diagram


6/18/18 08:51








Total Protein 6.7, Albumin 3.3 L, Calcium Level 8.5, Magnesium Level 1.6, 

Alkaline Phosphatase 77, Aspartate Amino Transf (AST/SGOT) 23, Alanine 

Aminotransferase (ALT/SGPT) 29, Total Bilirubin 0.8





I ordered IV potassium because the report was that she had an altered mental 

status.  However, she is now back to baseline.  Therefore, I will attempt to 

replace her potassium orally.





Lactic acid 5.9.  Fluids with a repeat lactic acid level have been ordered.





Troponin less than 0.02.





Tox screen is positive for benzodiazepines.





UA shows small leukocyte esterase, 2 white blood cells and moderate bacteria.  

Unfortunately, she does not have a recent UA in the computer.  Therefore, I am 

unable to tell whether or not she has a urinary tract infection which is 

resolving or if she has a urinary tract infection which is not responding to 

Cipro.





Repeat lactic acid is 1.6.





This patient is now stable for discharge to home.


Critical Care Narrative


Aggregate critical care time was *** minutes. Time to perform other separately 

billable procedures was not  


included in the critical care time. My time did not include minutes spent 

treating any other patients simultaneously or on  


activities that did not directly contribute to the patient's treatment.  





The services I provided to this patient were to treat and/or prevent clinically 

significant deterioration due to altered mental status, lactic acidosis





I provided critical care services requiring my management, as noted below:


Chart data review, documentation time, medication orders and management, vital 

sign assessments/reviewing monitor data,  


ordering and reviewing lab tests, ordering and interpreting/reviewing x-rays 

and diagnostic studies, care of the patient  


and discussion of the patient with the admitting physicians


Diagnosis





 Primary Impression:  


 Altered mental status


 Qualified Codes:  R41.0 - Disorientation, unspecified


 Additional Impressions:  


 Lactic acidosis


 Hypokalemia


Patient Instructions:  General Instructions, Hypokalemia (DC)





***Additional Instruction:  


See your doctor in a couple of days to have her potassium rechecked.


***Med/Other Pt SpecificInfo:  Prescription(s) given


Disposition:  01 DISCHARGE HOME


Condition:  Stable











Kiana Qiu MD Jun 18, 2018 09:18

## 2024-05-17 NOTE — HHI.DS
Psychiatry Discharge Summary


Inpatient Psychiatric care?:  Yes


Advance Directive:  No


Reason Not Provided:  Due to Patient Condition


Mental Health AdvanceDirective:  No


Health Care Proxy:  No


Admission


Admission Date


Jun 22, 2017 at 16:48


Admission Diagnosis:  


(1) Acute psychosis


ICD Code:  F23


(2) History of bipolar disorder


ICD Code:  Z86.59


Brief History


Patient is a 62-year-old Afro-American female comes here under Vasquez act signed 

by Dr. Bereket Mclaughlin [employer] [facility] dated 6/21/17 at 1900 hrs. that documented 

reviewed and agreed with essentially stating actively psychotic with aggressive 

behavior to family and staff.  Patient seen screened in ED urine toxicology 

positive for benzodiazepines which is being prescribed.  It appears patient has 

a long history of mental health contact and various medications.  At the 

present time she states she is seeing 2 different clinicians did not take nurse 

practitioner at [facility] and Dr. Jordan rogers local psychiatrist.  

This is led to some confusion with her  and daughter as to the 

appropriate medications.  Leading to the patient showing the psychotic break 

she acknowledges auditory hallucinations and vague visual hallucinations.  

Stating she had seen faces on the TV being distorted.  She did receive Haldol 

Ativan and Benadryl in the ED.  At the present time patient sitting quietly in 

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 

patient calm cooperative alert and oriented.  Now denying voices or visions 

denying suicidality homicidality.  Does acknowledge some multiple year history 

of issues with multiple prior psychiatric hospitalizations.  She denies any 

alcohol or drug use with this.  We did discuss the need to live up relationship 

with one prescribing psychiatrist.  To prevent the type of confusion perhaps 

duplication of medications in any event at the present time patient is calm 

cooperative willing to follow up with one clinician.  It appears she would 

choose Dr. Jordan rogers.  I will write 1 week medications that I recommend to 

bridge this transition including Respinol 1 mg twice a day Xanax 0.5 at at 

bedtime and Elavil 75 mg at at bedtime with no refills patient is agreeable to 

this.  We will then discharge patient to her family for follow-up within 1 week 

with Dr. rogers


Tobacco Use In Past 30 Days:  No Tobacco Past 30 Days


Alcohol Use:  Monthly or Less


Hospital Course


Please see above note dictated under brief history.  Patient does not meet 

Vasquez criteria will lift Vasquez act.  Patient discharged today to family with Rx 

as mentioned above.  To follow up with Dr. Jordan rogers within 1 week.  Patient 

continues to denies suicidality homicidality voices or visions is little 

oriented 4 cognitively intact





Results


Blood Pressure


52 / 58





 Vital Signs








  Date Time  Temp Pulse Resp B/P Pulse Ox O2 Delivery O2 Flow Rate FiO2


 


6/23/17 05:30 96.9 99 16 52/ 98   


 


6/22/17 22:00      Room Air  











Laboratory Tests








Test 6/21/17 6/21/17





 18:11 18:30


 


Mean Corpuscular Hemoglobin 26.5 PG 





 (27.0-34.0) 


 


Mean Corpuscular Hemoglobin 31.0 % 





Concent (32.0-36.0) 


 


Lymphocytes (%) (Auto) 49.8 % 





 (9.0-44.0) 


 


Monocytes (%) (Auto) 8.5 % (0.0-8.0) 


 


Blood Urea Nitrogen 6 MG/DL (7-18) 


 


Creatinine 1.05 MG/DL 





 (0.50-1.00) 


 


Estimat Glomerular Filtration 64 ML/MIN (>89) 





Rate  


 


Random Glucose 145 MG/DL 





 () 


 


Total Protein 8.7 GM/DL 





 (6.4-8.2) 


 


Urine Leukocyte Esterase  TRACE  (NEG)


 


Urine Mucus  FEW /lpf (OCC)


 


Urine Benzodiazepines Screen  POS  (NEG)








Summary of Procedures


None done


Imaging





Last Impressions








Head CT 6/21/17 1803 Signed





Impressions: 





 Service Date/Time:  Wednesday, June 21, 2017 18:45 - CONCLUSION:  No acute 





 intracranial abnormality is identified.     Stephon Reynolds MD 








Pending results at discharge:  No





Medications


# of Antipsychotic meds at D/C:  1


Approp Antipsych med options


1 - Minimum of three failed multiple trials of monotherapy.


2 - Documented plan to taper to monotherapy due to previous use of multiple 

meds OR cross-taper in progress at D/C.


3 - Documentation of augmentation of Clozapine.


4 - Justification other than those listed in allowable values 1-3, document here

:





Discharge


Discharge Date:  Jun 23, 2017


Discharge Diagnosis:  


(1) Acute psychosis


Diagnosis:  Principal


ICD Code:  F23


(2) History of bipolar disorder


Diagnosis:  Secondary


ICD Code:  Z86.59


Mental Status Exam at Disch


Alert oriented  female calm cooperative, she is normal active, mood is euthymic 

good range intensity of her affect.  Speech rate and rhythm within a normal 

limits, no formal thought disorders, no auditory or visual hallucinations no 

delusions noted insight and judgment is fair cognition grossly intact


Pt Condition on Discharge:  Stable


Discharge Disposition:  Discharge Home





Discharge Instructions


Diet Instructions:  As Tolerated, No Restrictions


Activities you can perform:  Regular-No Restrictions


Scheduled Appointment:  Dr. Jordan rogers





Discharge Time


> 30 minutes





Discharge/Advance Care Plan


Health Problems:  


(1) Acute psychosis


(2) History of bipolar disorder


Goals to promote your health


* To prevent worsening of your condition and complications


* To maintain your health at the optimal level


Directions to meet your goals


*** Take your medications as prescribed


***  Follow your dietary instruction


***  Follow activity as directed





***  Keep your appointments as scheduled


***  Take your immunizations and boosters as scheduled


***  If your symptoms worsen call your PCP, if no PCP go to Urgent Care Center 

or Emergency Room ***


***  For 24/7 questions related to your inpatient stay or results of tests 

pending at discharge, please contact Dr. Stephon Dominguez at [phone]


***  Smoking is Dangerous to Your Health. Avoid second hand smoking ***








Stephon Dominguez MD Jun 23, 2017 14:17 [[id number]]